# Patient Record
Sex: MALE | Race: BLACK OR AFRICAN AMERICAN | NOT HISPANIC OR LATINO | Employment: FULL TIME | URBAN - METROPOLITAN AREA
[De-identification: names, ages, dates, MRNs, and addresses within clinical notes are randomized per-mention and may not be internally consistent; named-entity substitution may affect disease eponyms.]

---

## 2017-06-28 ENCOUNTER — HOSPITAL ENCOUNTER (EMERGENCY)
Facility: HOSPITAL | Age: 42
Discharge: HOME | End: 2017-06-28
Attending: EMERGENCY MEDICINE
Payer: COMMERCIAL

## 2017-06-28 VITALS
SYSTOLIC BLOOD PRESSURE: 167 MMHG | TEMPERATURE: 98.2 F | HEART RATE: 75 BPM | DIASTOLIC BLOOD PRESSURE: 91 MMHG | RESPIRATION RATE: 18 BRPM | OXYGEN SATURATION: 99 % | WEIGHT: 206 LBS | BODY MASS INDEX: 28.84 KG/M2 | HEIGHT: 71 IN

## 2017-06-28 DIAGNOSIS — K08.89 PAIN, DENTAL: Primary | ICD-10-CM

## 2017-06-28 PROCEDURE — 99282 EMERGENCY DEPT VISIT SF MDM: CPT

## 2017-06-28 RX ORDER — IBUPROFEN 600 MG/1
600 TABLET ORAL ONCE
Status: COMPLETED | OUTPATIENT
Start: 2017-06-28 | End: 2017-06-28

## 2017-06-28 RX ADMIN — IBUPROFEN 600 MG: 600 TABLET, FILM COATED ORAL at 18:01

## 2017-08-12 ENCOUNTER — TRANSCRIBE ORDERS (OUTPATIENT)
Dept: LAB | Facility: HOSPITAL | Age: 42
End: 2017-08-12

## 2017-08-12 ENCOUNTER — APPOINTMENT (OUTPATIENT)
Dept: LAB | Facility: HOSPITAL | Age: 42
End: 2017-08-12
Payer: COMMERCIAL

## 2017-08-12 DIAGNOSIS — Z00.8 HEALTH EXAMINATION IN POPULATION SURVEY: ICD-10-CM

## 2017-08-12 DIAGNOSIS — Z00.8 HEALTH EXAMINATION IN POPULATION SURVEY: Primary | ICD-10-CM

## 2017-08-12 LAB
CHOLEST SERPL-MCNC: 137 MG/DL (ref 50–200)
EST. AVERAGE GLUCOSE BLD GHB EST-MCNC: 131 MG/DL
HBA1C MFR BLD: 6.2 % (ref 4.2–6.3)
HDLC SERPL-MCNC: 56 MG/DL (ref 40–60)
LDLC SERPL CALC-MCNC: 28 MG/DL (ref 0–100)
TRIGL SERPL-MCNC: 265 MG/DL

## 2017-08-12 PROCEDURE — 36415 COLL VENOUS BLD VENIPUNCTURE: CPT

## 2017-08-12 PROCEDURE — 83036 HEMOGLOBIN GLYCOSYLATED A1C: CPT

## 2017-08-12 PROCEDURE — 80061 LIPID PANEL: CPT

## 2018-01-12 NOTE — MISCELLANEOUS
Message  11/18/2016 - 11:08 AM - spoke with patient via phone, throat culture results reviewed (1+ Growth of Beta Hemolytic Streptococcus NOT Group A, C, or G); patient states he is feeling better (currently taking the Z-Bereket)      Signatures   Electronically signed by : Ayla Callahan DO; Nov 18 2016 11:13AM EST                       (Author)

## 2018-01-18 NOTE — MISCELLANEOUS
Message  Return to work or school:    He is able to return to work on  11/16/2016            Signatures   Electronically signed by : Damon Cm DO; Nov 14 2016 10:36AM EST                       (Author)

## 2018-01-18 NOTE — MISCELLANEOUS
Message  11/17/2016 - 16:38 - left message for patient regarding throat culture results (1+ Beta Hemolytic Streptococcus NOT Group A, C, or G)      Signatures   Electronically signed by : Janeth Martínez DO; Nov 17 2016  4:40PM EST                       (Author)

## 2021-11-15 ENCOUNTER — OFFICE VISIT (OUTPATIENT)
Dept: URGENT CARE | Age: 46
End: 2021-11-15
Payer: COMMERCIAL

## 2021-11-15 VITALS
OXYGEN SATURATION: 98 % | HEART RATE: 82 BPM | BODY MASS INDEX: 27.72 KG/M2 | HEIGHT: 71 IN | RESPIRATION RATE: 20 BRPM | DIASTOLIC BLOOD PRESSURE: 90 MMHG | TEMPERATURE: 98.3 F | WEIGHT: 198 LBS | SYSTOLIC BLOOD PRESSURE: 154 MMHG

## 2021-11-15 DIAGNOSIS — G43.901 MIGRAINE WITH STATUS MIGRAINOSUS, NOT INTRACTABLE, UNSPECIFIED MIGRAINE TYPE: Primary | ICD-10-CM

## 2021-11-15 PROCEDURE — S9083 URGENT CARE CENTER GLOBAL: HCPCS | Performed by: NURSE PRACTITIONER

## 2021-11-15 PROCEDURE — G0382 LEV 3 HOSP TYPE B ED VISIT: HCPCS | Performed by: NURSE PRACTITIONER

## 2021-11-15 RX ORDER — PREDNISONE 20 MG/1
20 TABLET ORAL 2 TIMES DAILY WITH MEALS
Qty: 10 TABLET | Refills: 0 | Status: SHIPPED | OUTPATIENT
Start: 2021-11-15 | End: 2021-11-20

## 2021-11-15 RX ORDER — ACETAMINOPHEN 500 MG
TABLET ORAL
COMMUNITY

## 2021-11-30 ENCOUNTER — RA CDI HCC (OUTPATIENT)
Dept: OTHER | Facility: HOSPITAL | Age: 46
End: 2021-11-30

## 2021-12-07 ENCOUNTER — OFFICE VISIT (OUTPATIENT)
Dept: FAMILY MEDICINE CLINIC | Facility: CLINIC | Age: 46
End: 2021-12-07

## 2021-12-07 VITALS
TEMPERATURE: 99.4 F | DIASTOLIC BLOOD PRESSURE: 90 MMHG | HEIGHT: 71 IN | OXYGEN SATURATION: 97 % | WEIGHT: 194 LBS | HEART RATE: 96 BPM | SYSTOLIC BLOOD PRESSURE: 140 MMHG | RESPIRATION RATE: 18 BRPM | BODY MASS INDEX: 27.16 KG/M2

## 2021-12-07 DIAGNOSIS — R33.9 URINARY RETENTION: ICD-10-CM

## 2021-12-07 DIAGNOSIS — G43.809 OTHER MIGRAINE WITHOUT STATUS MIGRAINOSUS, NOT INTRACTABLE: Primary | ICD-10-CM

## 2021-12-07 DIAGNOSIS — R21 RASH: ICD-10-CM

## 2021-12-07 PROBLEM — G43.909 MIGRAINE: Status: ACTIVE | Noted: 2021-12-07

## 2021-12-07 PROCEDURE — 3008F BODY MASS INDEX DOCD: CPT | Performed by: FAMILY MEDICINE

## 2021-12-07 PROCEDURE — 1036F TOBACCO NON-USER: CPT | Performed by: FAMILY MEDICINE

## 2021-12-07 PROCEDURE — 99213 OFFICE O/P EST LOW 20 MIN: CPT | Performed by: FAMILY MEDICINE

## 2021-12-07 RX ORDER — CLOTRIMAZOLE 1 %
CREAM (GRAM) TOPICAL 2 TIMES DAILY
Qty: 30 G | Refills: 0 | Status: SHIPPED | OUTPATIENT
Start: 2021-12-07

## 2021-12-07 RX ORDER — NAPROXEN 500 MG/1
500 TABLET ORAL 2 TIMES DAILY PRN
Qty: 30 TABLET | Refills: 1 | Status: SHIPPED | OUTPATIENT
Start: 2021-12-07

## 2021-12-07 RX ORDER — RIZATRIPTAN BENZOATE 5 MG/1
5 TABLET, ORALLY DISINTEGRATING ORAL ONCE AS NEEDED
Qty: 9 TABLET | Refills: 0 | Status: SHIPPED | OUTPATIENT
Start: 2021-12-07 | End: 2022-01-04 | Stop reason: SDUPTHER

## 2021-12-07 RX ORDER — TAMSULOSIN HYDROCHLORIDE 0.4 MG/1
0.4 CAPSULE ORAL
Qty: 30 CAPSULE | Refills: 0 | Status: SHIPPED | OUTPATIENT
Start: 2021-12-07 | End: 2022-01-04 | Stop reason: SDUPTHER

## 2021-12-09 ENCOUNTER — HOSPITAL ENCOUNTER (OUTPATIENT)
Dept: RADIOLOGY | Facility: HOSPITAL | Age: 46
Discharge: HOME | End: 2021-12-09
Payer: COMMERCIAL

## 2021-12-09 DIAGNOSIS — R33.9 URINARY RETENTION: ICD-10-CM

## 2021-12-09 PROCEDURE — 76857 US EXAM PELVIC LIMITED: CPT

## 2021-12-30 DIAGNOSIS — R33.9 URINARY RETENTION: ICD-10-CM

## 2022-01-04 DIAGNOSIS — R33.9 URINARY RETENTION: ICD-10-CM

## 2022-01-04 DIAGNOSIS — G43.809 OTHER MIGRAINE WITHOUT STATUS MIGRAINOSUS, NOT INTRACTABLE: ICD-10-CM

## 2022-01-05 ENCOUNTER — TELEPHONE (OUTPATIENT)
Dept: FAMILY MEDICINE CLINIC | Facility: CLINIC | Age: 47
End: 2022-01-05

## 2022-01-05 NOTE — TELEPHONE ENCOUNTER
Pharmacy requesting refill of Rizatriptan 5mg for migraines  Patient last seen 12/7/21, was asked to return in 4 weeks for headache follow up  Please call to schedule

## 2022-01-07 RX ORDER — TAMSULOSIN HYDROCHLORIDE 0.4 MG/1
0.4 CAPSULE ORAL
Qty: 30 CAPSULE | Refills: 2 | OUTPATIENT
Start: 2022-01-07

## 2022-01-07 RX ORDER — RIZATRIPTAN BENZOATE 5 MG/1
5 TABLET, ORALLY DISINTEGRATING ORAL ONCE AS NEEDED
Qty: 9 TABLET | Refills: 0 | Status: SHIPPED | OUTPATIENT
Start: 2022-01-07 | End: 2022-06-07

## 2022-01-07 RX ORDER — TAMSULOSIN HYDROCHLORIDE 0.4 MG/1
0.4 CAPSULE ORAL
Qty: 30 CAPSULE | Refills: 2 | Status: SHIPPED | OUTPATIENT
Start: 2022-01-07 | End: 2022-04-18

## 2022-04-18 DIAGNOSIS — R33.9 URINARY RETENTION: ICD-10-CM

## 2022-04-18 RX ORDER — TAMSULOSIN HYDROCHLORIDE 0.4 MG/1
CAPSULE ORAL
Qty: 30 CAPSULE | Refills: 2 | Status: SHIPPED | OUTPATIENT
Start: 2022-04-18

## 2022-05-20 DIAGNOSIS — G43.809 OTHER MIGRAINE WITHOUT STATUS MIGRAINOSUS, NOT INTRACTABLE: ICD-10-CM

## 2022-06-07 RX ORDER — RIZATRIPTAN BENZOATE 5 MG/1
TABLET, ORALLY DISINTEGRATING ORAL
Qty: 9 TABLET | Refills: 0 | Status: SHIPPED | OUTPATIENT
Start: 2022-06-07

## 2022-11-09 ENCOUNTER — OFFICE VISIT (OUTPATIENT)
Dept: FAMILY MEDICINE CLINIC | Facility: CLINIC | Age: 47
End: 2022-11-09

## 2022-11-09 VITALS
HEIGHT: 70 IN | DIASTOLIC BLOOD PRESSURE: 83 MMHG | HEART RATE: 85 BPM | RESPIRATION RATE: 20 BRPM | OXYGEN SATURATION: 100 % | SYSTOLIC BLOOD PRESSURE: 121 MMHG | BODY MASS INDEX: 24.77 KG/M2 | TEMPERATURE: 98.5 F | WEIGHT: 173 LBS

## 2022-11-09 DIAGNOSIS — R68.89 FLUCTUATION OF WEIGHT: ICD-10-CM

## 2022-11-09 DIAGNOSIS — F41.0 PANIC ATTACKS: Primary | ICD-10-CM

## 2022-11-09 DIAGNOSIS — R33.9 URINARY RETENTION: ICD-10-CM

## 2022-11-09 DIAGNOSIS — T78.40XA ALLERGY, INITIAL ENCOUNTER: ICD-10-CM

## 2022-11-09 RX ORDER — TAMSULOSIN HYDROCHLORIDE 0.4 MG/1
0.4 CAPSULE ORAL
Qty: 30 CAPSULE | Refills: 2 | Status: SHIPPED | OUTPATIENT
Start: 2022-11-09

## 2022-11-09 NOTE — LETTER
November 9, 2022     Patient: Douglas Ascencio  YOB: 1975  Date of Visit: 11/9/2022      To Whom it May Concern:    Douglas Ascencio is under my professional care  Jose Luis Gilbert was seen in my office on 11/9/2022  If you have any questions or concerns, please don't hesitate to call           Sincerely,          Cintia Chung DO        CC: No Recipients

## 2022-11-09 NOTE — PROGRESS NOTES
Name: Letty Treadwell      : 1975      MRN: [id number]  Encounter Provider: Belem Viramontes DO  Encounter Date: 2022   Encounter department: [facility]     1  Panic attacks  -     Ambulatory Referral to [facility]; Future  Chronic and controlled at this time  Will refer patient to behavioral health for thorough discussion and education regarding coping strategies as this worked well in the past   Will also have him see in house behavioral health in the interim until he can establish with a therapist in the long-term  2  Fluctuation of weight  Patient does not suffer from unintentional weight loss at this time but instead periodic fluctuations in his weight  He has no red flags on history or exam at this time  Likely etiology is related to his anxiety and panic attacks and fixation regarding his weight  Will advise the patient to keep a daily food diary as well as a log of his weights at home for discussion at next visit  3  Urinary retention  -     tamsulosin (FLOMAX) 0 4 mg; Take 1 capsule (0 4 mg total) by mouth daily with dinner  Patient refused digital rectal exam at this time  He believes he did have some improvement in his symptoms when on Flomax, so at this time we will resume this medication  Patient to consider receiving rectal exam at his next visit with his PCP in 3-4 weeks  4  Allergy, initial encounter  -     Ambulatory Referral to Allergy; Future  For his chronic food allergies, we will refer him to Allergy for formal evaluation and discussion regarding management options  Patient is understanding and accepting of above plan  Subjective      Letty Treadwell is a 41 y o  male presents with multiple complaints  His chief complaint is panic attack  Patient has a chronic history of intermittent panic attacks going on for at least the last 6 years    He states he has been to therapy before and was provided with formal coping strategies which helped until the last couple of years  He is unclear of precipitating causes for his panic attacks  He states he was previously managed with a medication but cannot recall what it was as it was so long ago  He is interested in treatment at this time  Denies suicidal or homicidal ideation at this time  Patient also reports fluctuation in his weight which is also chronic  He says that often his appetite is chronically poor and affected by how much marijuana he smokes  He states he frequently fixates on his weight and weighs himself often  He states when he feels is weight is lower than it should be, he adjusts his diet and increases his protein intake and notes that his weight improves quickly thereafter  Patient continues to report episodes of urinary retention  He states he often has the feeling of incomplete bladder emptying along with urinary frequency  He had some improvement in his symptoms with over-the-counter cranberry supplement  He states since his last visit, he has been out of his previously prescribed tamsulosin  He states he has not had a rectal exam in the past     Patient also continues to report issues with food allergy  He says he has chronically had issues eating uncooked bananas, wherein he notes itching and numbness around his mouth when ingested  He states he now has similar response to kiwi and other fruit in the last few months  He states cook fruit are not bothersome at this time  Patient states he has not had a migraine in sometime  Also states he recently had a colonoscopy at an outside institution on Monday 11/07/2022  Review of Systems   Constitutional: Positive for unexpected weight change  Negative for chills, fatigue and fever  HENT: Negative for congestion, postnasal drip, rhinorrhea, sinus pressure, sinus pain and sore throat  Respiratory: Negative for cough, shortness of breath and wheezing      Cardiovascular: Negative for chest pain and palpitations  Gastrointestinal: Negative for abdominal pain, constipation, diarrhea, nausea and vomiting  Genitourinary: Positive for frequency  Negative for difficulty urinating, dysuria, hematuria and urgency  Urinary retention   Musculoskeletal: Negative for arthralgias, back pain, gait problem and myalgias  Skin: Negative for pallor, rash and wound  Neurological: Negative for dizziness, weakness, light-headedness, numbness and headaches  Psychiatric/Behavioral: Negative for dysphoric mood and suicidal ideas  The patient is nervous/anxious  Current Outpatient Medications on File Prior to Visit   Medication Sig   • clotrimazole (LOTRIMIN) 1 % cream Apply topically 2 (two) times a day   • acetaminophen (TYLENOL) 500 mg tablet Take by mouth (Patient not taking: Reported on 11/9/2022)   • naproxen (Naprosyn) 500 mg tablet Take 1 tablet (500 mg total) by mouth 2 (two) times a day as needed for mild pain or headaches (Patient not taking: Reported on 11/9/2022)   • rizatriptan (MAXALT-MLT) 5 MG disintegrating tablet TAKE 1 TABLET ONCE AS NEEDED FOR MIGRAINE FOR UP TO 1 DOSE MAY REPEAT IN 2 HOURS IF NEEDED (Patient not taking: Reported on 11/9/2022)   • [DISCONTINUED] oxyCODONE-acetaminophen (PERCOCET) 5-325 mg per tablet Take 1 tablet by mouth every 4 (four) hours as needed for moderate pain  Max Daily Amount: 6 tablets (Patient not taking: No sig reported)   • [DISCONTINUED] tamsulosin (FLOMAX) 0 4 mg TAKE 1 CAPSULE BY MOUTH EVERY DAY WITH DINNER (Patient not taking: Reported on 11/9/2022)       Objective     /83 (BP Location: Left arm, Patient Position: Sitting, Cuff Size: Standard)   Pulse 85   Temp 98 5 °F (36 9 °C) (Temporal)   Resp 20   Ht 5' 10" (1 778 m)   Wt 78 5 kg (173 lb)   SpO2 100%   BMI 24 82 kg/m²     Physical Exam  Vitals and nursing note reviewed  Constitutional:       General: He is not in acute distress  Appearance: Normal appearance   He is normal weight  He is not ill-appearing, toxic-appearing or diaphoretic  HENT:      Head: Normocephalic and atraumatic  Eyes:      General: No scleral icterus  Extraocular Movements: Extraocular movements intact  Conjunctiva/sclera: Conjunctivae normal       Pupils: Pupils are equal, round, and reactive to light  Cardiovascular:      Rate and Rhythm: Normal rate and regular rhythm  Pulses: Normal pulses  Heart sounds: Normal heart sounds  No murmur heard  No friction rub  No gallop  Pulmonary:      Effort: Pulmonary effort is normal  No respiratory distress  Breath sounds: Normal breath sounds  No stridor  No wheezing or rhonchi  Abdominal:      General: Bowel sounds are normal  There is no distension  Palpations: Abdomen is soft  There is no mass  Tenderness: There is no abdominal tenderness  There is no guarding or rebound  Hernia: No hernia is present  Genitourinary:     Comments: Rectal exam refused  Musculoskeletal:         General: No swelling, tenderness or deformity  Cervical back: Neck supple  Lymphadenopathy:      Cervical: No cervical adenopathy  Skin:     General: Skin is warm and dry  Capillary Refill: Capillary refill takes less than 2 seconds  Coloration: Skin is not pale  Findings: No erythema or rash  Neurological:      General: No focal deficit present  Mental Status: He is alert  Psychiatric:         Mood and Affect: Mood normal          Behavior: Behavior normal          Thought Content:  Thought content normal          Judgment: Judgment normal        Roberta Carcamo DO

## 2022-11-09 NOTE — PATIENT INSTRUCTIONS
For panic attacks, we will refer you to behavioral health as well as our own behavior health specialist for management in the interim until you can establish with a long-term therapist     For urinary retention, we will restart you on tamsulosin and you will consider getting a rectal exam your next visit in 3-4 weeks  For your weight, please keep a daily food diary as well as log of weights when you do measure them  For your fruit allergies, we will refer you to an allergist for formal evaluation

## 2022-12-02 ENCOUNTER — RA CDI HCC (OUTPATIENT)
Dept: OTHER | Facility: HOSPITAL | Age: 47
End: 2022-12-02

## 2022-12-08 ENCOUNTER — TELEPHONE (OUTPATIENT)
Dept: FAMILY MEDICINE CLINIC | Facility: CLINIC | Age: 47
End: 2022-12-08

## 2022-12-08 DIAGNOSIS — Z13.228 SCREENING FOR METABOLIC DISORDER: ICD-10-CM

## 2022-12-08 DIAGNOSIS — Z11.59 NEED FOR HEPATITIS C SCREENING TEST: Primary | ICD-10-CM

## 2022-12-08 DIAGNOSIS — Z11.4 ENCOUNTER FOR SCREENING FOR HIV: ICD-10-CM

## 2022-12-09 ENCOUNTER — APPOINTMENT (OUTPATIENT)
Dept: LAB | Age: 47
End: 2022-12-09

## 2022-12-09 ENCOUNTER — OFFICE VISIT (OUTPATIENT)
Dept: FAMILY MEDICINE CLINIC | Facility: CLINIC | Age: 47
End: 2022-12-09

## 2022-12-09 VITALS
TEMPERATURE: 97.2 F | DIASTOLIC BLOOD PRESSURE: 100 MMHG | HEART RATE: 87 BPM | BODY MASS INDEX: 24.2 KG/M2 | HEIGHT: 70 IN | SYSTOLIC BLOOD PRESSURE: 148 MMHG | OXYGEN SATURATION: 100 % | WEIGHT: 169 LBS | RESPIRATION RATE: 18 BRPM

## 2022-12-09 DIAGNOSIS — R33.9 URINARY RETENTION: ICD-10-CM

## 2022-12-09 DIAGNOSIS — F41.0 PANIC ATTACKS: Primary | ICD-10-CM

## 2022-12-09 DIAGNOSIS — Z13.228 SCREENING FOR METABOLIC DISORDER: ICD-10-CM

## 2022-12-09 DIAGNOSIS — Z11.4 ENCOUNTER FOR SCREENING FOR HIV: ICD-10-CM

## 2022-12-09 DIAGNOSIS — Z11.59 NEED FOR HEPATITIS C SCREENING TEST: ICD-10-CM

## 2022-12-09 LAB
ALBUMIN SERPL BCP-MCNC: 4.5 G/DL (ref 3.5–5)
ALP SERPL-CCNC: 59 U/L (ref 46–116)
ALT SERPL W P-5'-P-CCNC: 32 U/L (ref 12–78)
ANION GAP SERPL CALCULATED.3IONS-SCNC: 2 MMOL/L (ref 4–13)
AST SERPL W P-5'-P-CCNC: 28 U/L (ref 5–45)
BILIRUB SERPL-MCNC: 0.95 MG/DL (ref 0.2–1)
BUN SERPL-MCNC: 13 MG/DL (ref 5–25)
CALCIUM SERPL-MCNC: 9.5 MG/DL (ref 8.3–10.1)
CHLORIDE SERPL-SCNC: 106 MMOL/L (ref 96–108)
CHOLEST SERPL-MCNC: 133 MG/DL
CO2 SERPL-SCNC: 30 MMOL/L (ref 21–32)
CREAT SERPL-MCNC: 0.84 MG/DL (ref 0.6–1.3)
ERYTHROCYTE [DISTWIDTH] IN BLOOD BY AUTOMATED COUNT: 12.5 % (ref 11.6–15.1)
GFR SERPL CREATININE-BSD FRML MDRD: 105 ML/MIN/1.73SQ M
GLUCOSE P FAST SERPL-MCNC: 104 MG/DL (ref 65–99)
HCT VFR BLD AUTO: 46.3 % (ref 36.5–49.3)
HCV AB SER QL: NORMAL
HDLC SERPL-MCNC: 90 MG/DL
HGB BLD-MCNC: 15.6 G/DL (ref 12–17)
LDLC SERPL CALC-MCNC: 29 MG/DL (ref 0–100)
MCH RBC QN AUTO: 31.8 PG (ref 26.8–34.3)
MCHC RBC AUTO-ENTMCNC: 33.7 G/DL (ref 31.4–37.4)
MCV RBC AUTO: 94 FL (ref 82–98)
NONHDLC SERPL-MCNC: 43 MG/DL
PLATELET # BLD AUTO: 328 THOUSANDS/UL (ref 149–390)
PMV BLD AUTO: 10.6 FL (ref 8.9–12.7)
POTASSIUM SERPL-SCNC: 4 MMOL/L (ref 3.5–5.3)
PROT SERPL-MCNC: 8.2 G/DL (ref 6.4–8.4)
RBC # BLD AUTO: 4.91 MILLION/UL (ref 3.88–5.62)
SODIUM SERPL-SCNC: 138 MMOL/L (ref 135–147)
T4 FREE SERPL-MCNC: 1 NG/DL (ref 0.76–1.46)
TRIGL SERPL-MCNC: 69 MG/DL
TSH SERPL DL<=0.05 MIU/L-ACNC: 0.37 UIU/ML (ref 0.45–4.5)
WBC # BLD AUTO: 5.51 THOUSAND/UL (ref 4.31–10.16)

## 2022-12-09 RX ORDER — BUPROPION HYDROCHLORIDE 75 MG/1
75 TABLET ORAL DAILY
Qty: 30 TABLET | Refills: 0 | Status: SHIPPED | OUTPATIENT
Start: 2022-12-09

## 2022-12-09 RX ORDER — HYDROXYZINE HYDROCHLORIDE 25 MG/1
25 TABLET, FILM COATED ORAL
Qty: 90 TABLET | Refills: 1 | Status: SHIPPED | OUTPATIENT
Start: 2022-12-09

## 2022-12-09 NOTE — ASSESSMENT & PLAN NOTE
-KIRA score is 8 today, indicating mild anxiety   -pt reports he has anxiety attacks where has chest pain, SOB, intense anxiety   Reports he has dealt with these for several years, but they have been getting worse recently  -labwork done yesterday: CBC, CMP, Lipid panel: WNL, TSH 0 37, T4 WNL @ 1 00   -pt has been unable to get in with psychiatry, behavioral health  -has been on paxil in the past for anxiety, but discontinued because of loss of libido     Plan:  · Start welbutrin 37 5mg QD today  · Start atarax 25mg qhs prn anxiety  · Referrals to psychiatry and behavioral health placed today  · Return for follow up in 2 weeks; will increase Welbutrin to 75mg QD at that time if tolerating well

## 2022-12-09 NOTE — LETTER
December 9, 2022     Patient: Aria Pride  YOB: 1975  Date of Visit: 12/9/2022      To Whom it May Concern:    Aria Pride is under my professional care  Yasmin Montejo was seen in my office on 12/9/2022  Yasmin Montejo may return to work on 12/10/22       If you have any questions or concerns, please don't hesitate to call           Sincerely,          Marco Briones DO        CC: No Recipients

## 2022-12-09 NOTE — ASSESSMENT & PLAN NOTE
-chronic for several years, pt with sx of increased urinary frequency, incomplete bladder emptying  -has bladder US in 2021    -taking tamsulosin 0 4mg QD with some relief   -ddx: BPH vs UTI vs obstruction     Plan:  · Check UA, urine cx, PSA  · Referral to urology given

## 2022-12-09 NOTE — PROGRESS NOTES
Name: Debbie Prado      : 1975      MRN: [id number]  Encounter Provider: Jas Young DO  Encounter Date: 2022   Encounter department: [address] [place] [address]  Panic attacks  Assessment & Plan:  -KIRA score is 8 today, indicating mild anxiety   -pt reports he has anxiety attacks where has chest pain, SOB, intense anxiety  Reports he has dealt with these for several years, but they have been getting worse recently  -labwork done yesterday: CBC, CMP, Lipid panel: WNL, TSH 0 37, T4 WNL @ 1 00   -pt has been unable to get in with psychiatry, behavioral health  -has been on paxil in the past for anxiety, but discontinued because of loss of libido     Plan:  · Start welbutrin 37 5mg QD today  · Start atarax 25mg qhs prn anxiety  · Referrals to psychiatry and behavioral health placed today  · Return for follow up in 2 weeks; will increase Welbutrin to 75mg QD at that time if tolerating well    Orders:  -     Ambulatory Referral to Psychiatry; Future  -     Ambulatory Referral to [facility]; Future  -     buPROPion (WELLBUTRIN) 75 mg tablet; Take 1 tablet (75 mg total) by mouth in the morning  -     hydrOXYzine HCL (ATARAX) 25 mg tablet; Take 1 tablet (25 mg total) by mouth daily at bedtime    2  Urinary retention  Assessment & Plan:  -chronic for several years, pt with sx of increased urinary frequency, incomplete bladder emptying  -has bladder US in     -taking tamsulosin 0 4mg QD with some relief   -ddx: BPH vs UTI vs obstruction     Plan:  · Check UA, urine cx, PSA  · Referral to urology given       Orders:  -     PSA Total (Reflex To Free); Future  -     Ambulatory Referral to Urology; Future  -     Urinalysis with microscopic  -     Urine culture; Future         Subjective      Pt presents to discuss anxiety, difficulty urinating  Pt explains he has had anxiety for several years, but recently it has been getting worse   He has been having panic attacks where his chest feels tight and he feels like he can't breathe for several minutes before it resolves on his own  This has happed 3 times in the past week, and it is interfering with his work  He was on paxil a while ago which he reports helped with his anxiety, but decreased his libido so he stopped taking it  He has not been taking any medication or seeing therapy/psychiatry  Also complains of urinary difficulty  Has urinary frequency throughout the day, and feels like he does not completely empty his bladder when he does urinate  Has been taking flomax, which he was prescribed at his last visit on 11/9, he states it is helping with his symptoms  Denies any dysuria or hematuria  Smokes medical marijuana, and reports it helps with his appetite  Review of Systems   Constitutional: Negative for chills and fever  HENT: Negative for ear pain and sore throat  Eyes: Negative for pain and visual disturbance  Respiratory: Negative for cough and shortness of breath  Cardiovascular: Negative for chest pain and palpitations  Gastrointestinal: Negative for abdominal pain, constipation and vomiting  Genitourinary: Positive for decreased urine volume and difficulty urinating  Negative for dysuria, hematuria, penile discharge, penile pain and penile swelling  Musculoskeletal: Negative for arthralgias and back pain  Skin: Negative for color change and rash  Neurological: Negative for seizures and syncope  Psychiatric/Behavioral: The patient is nervous/anxious  All other systems reviewed and are negative        Current Outpatient Medications on File Prior to Visit   Medication Sig   • clotrimazole (LOTRIMIN) 1 % cream Apply topically 2 (two) times a day   • tamsulosin (FLOMAX) 0 4 mg Take 1 capsule (0 4 mg total) by mouth daily with dinner   • acetaminophen (TYLENOL) 500 mg tablet Take by mouth (Patient not taking: Reported on 11/9/2022)   • naproxen (Naprosyn) 500 mg tablet Take 1 tablet (500 mg total) by mouth 2 (two) times a day as needed for mild pain or headaches (Patient not taking: Reported on 11/9/2022)   • rizatriptan (MAXALT-MLT) 5 MG disintegrating tablet TAKE 1 TABLET ONCE AS NEEDED FOR MIGRAINE FOR UP TO 1 DOSE MAY REPEAT IN 2 HOURS IF NEEDED (Patient not taking: Reported on 11/9/2022)       Objective     /100 (BP Location: Left arm, Patient Position: Sitting, Cuff Size: Standard)   Pulse 87   Temp (!) 97 2 °F (36 2 °C) (Temporal)   Resp 18   Ht 5' 10" (1 778 m)   Wt 76 7 kg (169 lb)   SpO2 100%   BMI 24 25 kg/m²     Physical Exam  Constitutional:       General: He is not in acute distress  Appearance: He is not ill-appearing or toxic-appearing  HENT:      Head: Normocephalic and atraumatic  Right Ear: External ear normal       Left Ear: External ear normal       Nose: Nose normal    Eyes:      General: No scleral icterus  Right eye: No discharge  Left eye: No discharge  Conjunctiva/sclera: Conjunctivae normal    Cardiovascular:      Rate and Rhythm: Normal rate and regular rhythm  Pulses: Normal pulses  Heart sounds: Normal heart sounds  No murmur heard  Pulmonary:      Effort: Pulmonary effort is normal       Breath sounds: Normal breath sounds  No wheezing, rhonchi or rales  Musculoskeletal:         General: Normal range of motion  Skin:     General: Skin is warm and dry  Coloration: Skin is not jaundiced  Neurological:      General: No focal deficit present  Mental Status: He is alert and oriented to person, place, and time  Psychiatric:         Attention and Perception: Attention normal          Mood and Affect: Mood is anxious  Behavior: Behavior is agitated  Behavior is cooperative         Ana Blades, DO

## 2022-12-10 LAB
EST. AVERAGE GLUCOSE BLD GHB EST-MCNC: 120 MG/DL
HBA1C MFR BLD: 5.8 %
HIV 1+2 AB+HIV1 P24 AG SERPL QL IA: NORMAL

## 2022-12-12 ENCOUNTER — TELEPHONE (OUTPATIENT)
Dept: UROLOGY | Facility: MEDICAL CENTER | Age: 47
End: 2022-12-12

## 2022-12-12 NOTE — TELEPHONE ENCOUNTER
Please Triage -   New Patient- Parker    What is the reason for the patients appointment? Patient called to schedule appointment for urinary retention  He feels his bladder does not empty completely  Imaging/Lab Results:      Do we accept the patient's insurance or is the patient Self-Pay? Provider & Plan:   Member ID#: Has the patient had any previous urologist(s)?no        Have patient records been requested? Has the patient had any outside testing done?       Does the patient have a personal history of cancer?no      Patient can be reached at :

## 2022-12-14 ENCOUNTER — TELEPHONE (OUTPATIENT)
Dept: FAMILY MEDICINE CLINIC | Facility: CLINIC | Age: 47
End: 2022-12-14

## 2022-12-14 ENCOUNTER — TELEPHONE (OUTPATIENT)
Dept: PSYCHIATRY | Facility: CLINIC | Age: 47
End: 2022-12-14

## 2022-12-14 NOTE — TELEPHONE ENCOUNTER
Folder (To be completed by) -Dr Davidson Kothari     Name of Form - [employer] [facility]  PH# [phone]    Color folder RED    Form to be Faxed (Fax #),[phone]    Called pt and left msg to schedule appt    He was supposed to f/up in 2 wks from 12/9

## 2022-12-14 NOTE — TELEPHONE ENCOUNTER
Good Afternoon Dr Gaston Zhou   After chart review last office visit was on 12/09/2022 with yourself for panic attacks  Patient will need an appointment in order for you to review and sign form? Let us know   Thanks

## 2022-12-14 NOTE — TELEPHONE ENCOUNTER
Called patient regarding referral to be placed on proper wait list  LVM for patient to call intake dept ([phone]) back

## 2022-12-16 ENCOUNTER — OFFICE VISIT (OUTPATIENT)
Dept: FAMILY MEDICINE CLINIC | Facility: CLINIC | Age: 47
End: 2022-12-16

## 2022-12-16 VITALS
DIASTOLIC BLOOD PRESSURE: 90 MMHG | TEMPERATURE: 98.9 F | BODY MASS INDEX: 24.05 KG/M2 | HEIGHT: 70 IN | HEART RATE: 87 BPM | RESPIRATION RATE: 21 BRPM | OXYGEN SATURATION: 99 % | WEIGHT: 168 LBS | SYSTOLIC BLOOD PRESSURE: 142 MMHG

## 2022-12-16 DIAGNOSIS — F41.0 PANIC ATTACKS: Primary | ICD-10-CM

## 2022-12-16 NOTE — ASSESSMENT & PLAN NOTE
-pt reports he has anxiety attacks where has chest pain, SOB, intense anxiety   Reports he has dealt with these for several years, but they had been getting worse recently, improved since he began taking Wellbutrin and atarax prn at last visit on 12/10  -pt has been unable to get in with psychiatry, behavioral health, is on waitlist  -has been on paxil in the past for anxiety, but discontinued because of loss of libido   -needs [facility] paperwork filled out today stating that he is not to be penalized for missing work when he is having a panic attack    Plan:  · Continue Wellbutrin and atarax prn  · Follow up in 1 month   · [facility] paperwork completed, will fax

## 2022-12-16 NOTE — PROGRESS NOTES
Name: Araceli Fox      : 1975      MRN: [id number]  Encounter Provider: Lisandra Melo DO  Encounter Date: 2022   Encounter department: [address] [address]  Panic attacks  Assessment & Plan:  -pt reports he has anxiety attacks where has chest pain, SOB, intense anxiety  Reports he has dealt with these for several years, but they had been getting worse recently, improved since he began taking Wellbutrin and atarax prn at last visit on 12/10  -pt has been unable to get in with psychiatry, behavioral health, is on waitlist  -has been on paxil in the past for anxiety, but discontinued because of loss of libido   -needs [employer]MLA paperwork filled out today stating that he is not to be penalized for missing work when he is having a panic attack    Plan:  · Continue Wellbutrin and atarax prn  · Follow up in 1 month   · FMLA paperwork completed, will fax                Subjective      Pt presents for anxiety follow up and to have form filled out for [facility] for his anxiety attacks  Pt works at [employer] and when he has panic attacks 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  States he has panic attacks atleast weekly, although recently it has been closer to every day  He does notice a difference and improvement in his anxiety since starting Wellbutrin and Hydroxyzine at his last visit on 12/10/22  States he has only had one panic attack this past week, down from daily panic attacks prior to starting medication, and the atarax really helps him with his sleep  Review of Systems   Constitutional: Negative for chills and fever  HENT: Negative for ear pain and sore throat  Eyes: Negative for pain and visual disturbance  Respiratory: Negative for cough and shortness of breath  Cardiovascular: Negative for chest pain and palpitations  Gastrointestinal: Negative for abdominal pain and vomiting  Genitourinary: Negative for dysuria and hematuria  Musculoskeletal: Negative for arthralgias and back pain  Skin: Negative for color change and rash  Neurological: Negative for seizures and syncope  Psychiatric/Behavioral: The patient is nervous/anxious (improving)  All other systems reviewed and are negative  Current Outpatient Medications on File Prior to Visit   Medication Sig   • acetaminophen (TYLENOL) 500 mg tablet Take by mouth   • buPROPion (WELLBUTRIN) 75 mg tablet Take 1 tablet (75 mg total) by mouth in the morning   • clotrimazole (LOTRIMIN) 1 % cream Apply topically 2 (two) times a day   • hydrOXYzine HCL (ATARAX) 25 mg tablet Take 1 tablet (25 mg total) by mouth daily at bedtime   • naproxen (Naprosyn) 500 mg tablet Take 1 tablet (500 mg total) by mouth 2 (two) times a day as needed for mild pain or headaches   • rizatriptan (MAXALT-MLT) 5 MG disintegrating tablet TAKE 1 TABLET ONCE AS NEEDED FOR MIGRAINE FOR UP TO 1 DOSE MAY REPEAT IN 2 HOURS IF NEEDED   • tamsulosin (FLOMAX) 0 4 mg Take 1 capsule (0 4 mg total) by mouth daily with dinner       Objective     /90 (BP Location: Left arm, Patient Position: Sitting, Cuff Size: Large)   Pulse 87   Temp 98 9 °F (37 2 °C) (Temporal)   Resp 21   Ht 5' 10" (1 778 m)   Wt 76 2 kg (168 lb)   SpO2 99%   BMI 24 11 kg/m²     Physical Exam  Constitutional:       General: He is not in acute distress  Appearance: Normal appearance  He is not ill-appearing or toxic-appearing  HENT:      Head: Normocephalic and atraumatic  Right Ear: External ear normal       Left Ear: External ear normal       Nose: Nose normal       Mouth/Throat:      Mouth: Mucous membranes are moist       Pharynx: Oropharynx is clear  Eyes:      General: No scleral icterus  Conjunctiva/sclera: Conjunctivae normal    Cardiovascular:      Rate and Rhythm: Normal rate and regular rhythm  Heart sounds: Normal heart sounds  No murmur heard    Pulmonary: Effort: Pulmonary effort is normal       Breath sounds: Normal breath sounds  No wheezing, rhonchi or rales  Musculoskeletal:         General: Normal range of motion  Cervical back: Neck supple  Skin:     General: Skin is warm and dry  Coloration: Skin is not jaundiced  Neurological:      General: No focal deficit present  Mental Status: He is alert and oriented to person, place, and time  Psychiatric:         Mood and Affect: Mood is anxious (still anxious, but much improved from last visit)  Speech: Speech normal          Behavior: Behavior normal  Behavior is cooperative         Kimberly Friend, DO

## 2022-12-19 NOTE — TELEPHONE ENCOUNTER
Good Morning Clerical  Form reviewed and signed by Dr Ana Machado  Form placed in the Red Folder in the [address] [place] [address] for scan and fax to [phone]   Thanks

## 2023-01-02 DIAGNOSIS — F41.0 PANIC ATTACKS: ICD-10-CM

## 2023-01-03 RX ORDER — BUPROPION HYDROCHLORIDE 75 MG/1
75 TABLET ORAL DAILY
Qty: 90 TABLET | Refills: 1 | Status: SHIPPED | OUTPATIENT
Start: 2023-01-03

## 2023-01-04 ENCOUNTER — OFFICE VISIT (OUTPATIENT)
Dept: UROLOGY | Facility: AMBULATORY SURGERY CENTER | Age: 48
End: 2023-01-04

## 2023-01-04 VITALS
HEART RATE: 84 BPM | DIASTOLIC BLOOD PRESSURE: 88 MMHG | RESPIRATION RATE: 18 BRPM | BODY MASS INDEX: 24.05 KG/M2 | HEIGHT: 70 IN | OXYGEN SATURATION: 96 % | WEIGHT: 168 LBS | SYSTOLIC BLOOD PRESSURE: 150 MMHG

## 2023-01-04 DIAGNOSIS — Z12.5 SCREENING FOR PROSTATE CANCER: ICD-10-CM

## 2023-01-04 DIAGNOSIS — R33.9 URINARY RETENTION: Primary | ICD-10-CM

## 2023-01-04 LAB — POST-VOID RESIDUAL VOLUME, ML POC: 84 ML

## 2023-01-04 RX ORDER — TAMSULOSIN HYDROCHLORIDE 0.4 MG/1
0.8 CAPSULE ORAL
Qty: 180 CAPSULE | Refills: 2 | Status: SHIPPED | OUTPATIENT
Start: 2023-01-04

## 2023-01-04 NOTE — PROGRESS NOTES
01/04/23    Gray Marroquin   1975   [id number]     Assessment  1 Difficulty emptying   2 Prostate cancer screening     Discussion/Plan  1 Difficulty emptying    PVR 84    Continue Tamsulosin at increased dose of 0 8 mg once daily   Hydrate with water, avoid bladder irritants, avoid constipation, double void   Reviewed that cold medications, use of medical marijuana, and prescription hydroxyzine can contribute to hesitancy and urinary retention   2 Prostate cancer screening    PSA ordered      Return in 4 weeks to review efficacy of medication and review PSA  Subjective  HPI   Gray Marroquin is a 41year old male with history of chronic urinary retention and dysuria  He is managed on Tamsulosin 0 4 mg once daily prescribed by his PCP  He noticed changes to his urinary pattern a few years ago after he had perianal abscess and fistula which was treated and repaired  His urinary frequency increased and he noticed difficulties emptying  Imaging from December 2021 showed he emptied his bladder well with pre-void 274 cc and post void 38 cc  He denies diabetes history  He has never required CIC or had an indwelling catheter  He hydrates adequately with water  He is a current 1/2 ppd smoker and uses medical marijuana regularly  He states his dad has history of prostate cancer  He denies gross hematuria, dysuria, pelvic pain, unintentional weight loss or infectious symptoms  Review of Systems - History obtained from chart review and the patient  General ROS: negative  Psychological ROS: negative  Respiratory ROS: no cough, shortness of breath, or wheezing  Cardiovascular ROS: no chest pain or dyspnea on exertion  Gastrointestinal ROS: no abdominal pain, change in bowel habits, or black or bloody stools  Genito-Urinary ROS: positive for - dysuria  Musculoskeletal ROS: negative  Neurological ROS: negative  Dermatological ROS: negative       Objective  Physical Exam  Vitals and nursing note reviewed     Constitutional: General: He is awake  He is not in acute distress  Appearance: Normal appearance  He is well-developed, well-groomed and normal weight  He is not ill-appearing, toxic-appearing or diaphoretic  Pulmonary:      Effort: Pulmonary effort is normal    Abdominal:      Tenderness: There is no right CVA tenderness or left CVA tenderness  Musculoskeletal:         General: Normal range of motion  Cervical back: Normal range of motion and neck supple  Skin:     General: Skin is warm  Neurological:      General: No focal deficit present  Mental Status: He is alert and oriented to person, place, and time  Mental status is at baseline  Psychiatric:         Attention and Perception: Attention normal          Mood and Affect: Mood normal          Speech: Speech normal          Behavior: Behavior normal  Behavior is cooperative  Thought Content: Thought content normal          Cognition and Memory: Cognition normal          Judgment: Judgment normal              WESLEY Maynard     I have spent 15 minutes with Patient  today in which greater than 50% of this time was spent in counseling/coordination of care regarding Risks and benefits of tx options, Intructions for management, Patient and family education, Importance of tx compliance and Risk factor reductions

## 2023-02-15 ENCOUNTER — TELEPHONE (OUTPATIENT)
Dept: FAMILY MEDICINE CLINIC | Facility: CLINIC | Age: 48
End: 2023-02-15

## 2023-02-15 NOTE — TELEPHONE ENCOUNTER
Folder (To be completed by) -  Dr True Girard     Name of Form - Updated FMLA from 12/14/22    Color folder (RED0    Form to be Faxed (Fax #),      Patient was made aware of the 7-10 business day form policy

## 2023-02-16 NOTE — TELEPHONE ENCOUNTER
Form in the Red Folder in the Clinical Area for Dr Shobha Dueñas to review and sign [Hoarseness] : no hoarseness [Itching] : Itching [Skin Rash] : skin rash [Negative] : Psychiatric

## 2023-02-23 NOTE — TELEPHONE ENCOUNTER
Form reviewed and signed by Dr Harjit Ferreira  Form placed in the Red Folder in the [address] [place] [address] for scan and fax to [phone]   Thanks

## 2023-06-05 ENCOUNTER — TELEPHONE (OUTPATIENT)
Dept: FAMILY MEDICINE CLINIC | Facility: CLINIC | Age: 48
End: 2023-06-05

## 2023-06-05 NOTE — TELEPHONE ENCOUNTER
Folder (To be completed by) -Dr Ankush Pompa      Name of Form - [employer] [place]    Color folder (Red)    Form to be Faxed (Fax #), Mailed (Address), or Picked up (By whom) -    Patient was made aware of the 7-10 business day form policy

## 2023-06-16 ENCOUNTER — OFFICE VISIT (OUTPATIENT)
Dept: FAMILY MEDICINE CLINIC | Facility: CLINIC | Age: 48
End: 2023-06-16

## 2023-06-16 VITALS
TEMPERATURE: 98.5 F | DIASTOLIC BLOOD PRESSURE: 88 MMHG | BODY MASS INDEX: 23.68 KG/M2 | SYSTOLIC BLOOD PRESSURE: 126 MMHG | WEIGHT: 165 LBS

## 2023-06-16 DIAGNOSIS — F41.0 PANIC ATTACKS: Primary | ICD-10-CM

## 2023-06-16 PROCEDURE — 99213 OFFICE O/P EST LOW 20 MIN: CPT | Performed by: FAMILY MEDICINE

## 2023-06-16 RX ORDER — BUPROPION HYDROCHLORIDE 100 MG/1
100 TABLET ORAL DAILY
Qty: 30 TABLET | Refills: 0 | Status: SHIPPED | OUTPATIENT
Start: 2023-06-16 | End: 2023-07-16

## 2023-06-16 NOTE — ASSESSMENT & PLAN NOTE
-pt reports he has anxiety attacks where has chest pain, SOB, intense anxiety   Reports he has dealt with these for several years, but they had been getting worse recently, improved since he began taking Wellbutrin in Dec 2022, but began recurring over past few weeks  -pt has been unable to get in with psychiatry, behavioral health, is on waitlist  -has been on paxil in the past for anxiety, but discontinued because of loss of libido and does not want to be on any other SSRIs for that reason  -needs [facility] paperwork filled updated today stating that he is not to be penalized for missing work when he is having a panic attack  -KIRA-7 score is 8 today, indicating mild anxiety    Plan:  · Increase wellbutrin from 75mg to 100mg QD  · Follow up in 2 weeks, can consider increasing Wellbutrin to 150mg QD at that time if needed   · [facility] paperwork completed, will fax

## 2023-06-16 NOTE — PROGRESS NOTES
Name: Paulo Urban      : 1975      MRN: [id number]  Encounter Provider: Néstor Alvarez DO  Encounter Date: 2023   Encounter department: [address] [place] [address]  Panic attacks  Assessment & Plan:  -pt reports he has anxiety attacks where has chest pain, SOB, intense anxiety  Reports he has dealt with these for several years, but they had been getting worse recently, improved since he began taking Wellbutrin in Dec 2022, but began recurring over past few weeks  -pt has been unable to get in with psychiatry, behavioral health, is on waitlist  -has been on paxil in the past for anxiety, but discontinued because of loss of libido and does not want to be on any other SSRIs for that reason  -needs [facility] paperwork filled updated today stating that he is not to be penalized for missing work when he is having a panic attack  -KIRA-7 score is 8 today, indicating mild anxiety    Plan:  · Increase wellbutrin from 75mg to 100mg QD  · Follow up in 2 weeks, can consider increasing Wellbutrin to 150mg QD at that time if needed   · [facility] paperwork completed, will fax         Orders:  -     buPROPion (WELLBUTRIN) 100 mg tablet; Take 1 tablet (100 mg total) by mouth in the morning         Subjective      Pt presents for follow up visit for panic attacks  States they were well controlled on Wellbutrin 75mg QD for several months but have been returning over the past few weeks  Denies any specific triggers  Does not take atarax prn as he finds it makes him too sleepy  Requests his [place] paperwork be updated stating that he may have time off when he is having a panic attack  Review of Systems   Constitutional: Negative for chills and fever  HENT: Negative for ear pain and sore throat  Eyes: Negative for pain and visual disturbance  Respiratory: Negative for cough and shortness of breath  Cardiovascular: Negative for chest pain and palpitations  Gastrointestinal: Negative for abdominal pain, constipation and vomiting  Genitourinary: Negative for dysuria and hematuria  Musculoskeletal: Negative for arthralgias and back pain  Skin: Negative for color change and rash  Neurological: Negative for seizures and syncope  Psychiatric/Behavioral: The patient is nervous/anxious  All other systems reviewed and are negative  Current Outpatient Medications on File Prior to Visit   Medication Sig   • acetaminophen (TYLENOL) 500 mg tablet Take by mouth   • clotrimazole (LOTRIMIN) 1 % cream Apply topically 2 (two) times a day   • naproxen (Naprosyn) 500 mg tablet Take 1 tablet (500 mg total) by mouth 2 (two) times a day as needed for mild pain or headaches   • rizatriptan (MAXALT-MLT) 5 MG disintegrating tablet TAKE 1 TABLET ONCE AS NEEDED FOR MIGRAINE FOR UP TO 1 DOSE MAY REPEAT IN 2 HOURS IF NEEDED   • tamsulosin (FLOMAX) 0 4 mg Take 2 capsules (0 8 mg total) by mouth daily with dinner   • [DISCONTINUED] buPROPion (WELLBUTRIN) 75 mg tablet TAKE 1 TABLET (75 MG TOTAL) BY MOUTH IN THE MORNING   • [DISCONTINUED] hydrOXYzine HCL (ATARAX) 25 mg tablet Take 1 tablet (25 mg total) by mouth daily at bedtime       Objective     /88   Temp 98 5 °F (36 9 °C)   Wt 74 8 kg (165 lb)   BMI 23 68 kg/m²     Physical Exam  Constitutional:       General: He is not in acute distress  Appearance: He is not ill-appearing or toxic-appearing  HENT:      Head: Normocephalic and atraumatic  Right Ear: External ear normal       Left Ear: External ear normal       Nose: Nose normal       Mouth/Throat:      Mouth: Mucous membranes are moist       Pharynx: Oropharynx is clear  Eyes:      General: No scleral icterus  Conjunctiva/sclera: Conjunctivae normal    Cardiovascular:      Rate and Rhythm: Normal rate and regular rhythm  Heart sounds: Normal heart sounds  No murmur heard    Pulmonary:      Effort: Pulmonary effort is normal  No respiratory distress  Breath sounds: Normal breath sounds  No wheezing, rhonchi or rales  Abdominal:      General: Bowel sounds are normal       Palpations: Abdomen is soft  Tenderness: There is no abdominal tenderness  There is no guarding  Hernia: No hernia is present  Musculoskeletal:         General: No swelling  Right lower leg: No edema  Left lower leg: No edema  Skin:     General: Skin is warm and dry  Coloration: Skin is not jaundiced  Neurological:      General: No focal deficit present  Mental Status: He is alert and oriented to person, place, and time  Mental status is at baseline     Psychiatric:         Mood and Affect: Mood normal          Behavior: Behavior normal        Aleja Bench, DO

## 2023-07-21 DIAGNOSIS — F41.0 PANIC ATTACKS: ICD-10-CM

## 2023-07-21 NOTE — TELEPHONE ENCOUNTER
6/16/2023 patient was suppose to follow up in two weeks and was a no show. Please contact patient for an appt. Thank you.

## 2023-07-24 RX ORDER — BUPROPION HYDROCHLORIDE 100 MG/1
100 TABLET ORAL DAILY
Qty: 30 TABLET | Refills: 0 | Status: SHIPPED | OUTPATIENT
Start: 2023-07-24 | End: 2023-07-25

## 2023-07-25 ENCOUNTER — OFFICE VISIT (OUTPATIENT)
Dept: FAMILY MEDICINE CLINIC | Facility: CLINIC | Age: 48
End: 2023-07-25

## 2023-07-25 DIAGNOSIS — F41.0 PANIC ATTACKS: Primary | ICD-10-CM

## 2023-07-25 PROCEDURE — 99213 OFFICE O/P EST LOW 20 MIN: CPT | Performed by: FAMILY MEDICINE

## 2023-07-25 RX ORDER — BUPROPION HYDROCHLORIDE 150 MG/1
150 TABLET ORAL EVERY MORNING
Qty: 90 TABLET | Refills: 1 | Status: SHIPPED | OUTPATIENT
Start: 2023-07-25 | End: 2024-01-21

## 2023-07-25 NOTE — PROGRESS NOTES
Name: Brigid Gutierrze      : 1975      MRN: [id number]  Encounter Provider: Luis Alberto Magallon DO  Encounter Date: 2023   Encounter department: [address]     1. Panic attacks  Assessment & Plan:  -pt reports he has anxiety attacks where has chest pain, SOB, intense anxiety. Reports he has dealt with these for several years, but they had been getting worse recently, improved since he began taking Wellbutrin in Dec 2022 and was well controlled for several months, but began recurring in 2023  -pt has been unable to get in with psychiatry, behavioral health, is on waitlist  -has been on paxil in the past for anxiety, but discontinued because of loss of libido and does not want to be on any other SSRIs for that reason; has been on prn atarax in addition to Wellbutrin for anxiety but discontinued because it made him drowsy throughout the day  -currently taking Wellbutrin 100mg; does not notice much of an improvement since increasing dose from 75mg at last visit on     Plan:  · Increase wellbutrin from 100mg to 150mg QD; discussed taking 75mg BID, but patient prefers to take it once daily given his work schedule  · Reviewed labwork, last TSH in Dec 2022 was low at 0.374 with reflex T4 WNL, will get repeart TSH, CMP   · Follow up in 1 month, will also schedule with Dr. Myrtle Zhou for soonest possible appointment         Orders:  -     TSH, 3rd generation with Free T4 reflex; Future  -     Comprehensive metabolic panel; Future  -     Ambulatory Referral to [facility]; Future  -     buPROPion (WELLBUTRIN XL) 150 mg 24 hr tablet; Take 1 tablet (150 mg total) by mouth every morning         Subjective      Here for follow up for anxiety, panic attacks. He was well controlled on Wellbutrin 75mg for several months, but panic attacks resumed in 2023. Pt denies any new triggers. Was last seen in the office on .  At that time Wellbutrin was increased from 75mg to 100mg. Pt reports he does not notice much of an improvement. States since then he has had 3 panic attacks over the past few weeks. Feels like they just come on out of nowhere. He feels stressful thoughts about work trigger them sometimes, but sometimes there is no trigger at all. Review of Systems   Constitutional: Negative for chills and fever. HENT: Negative for ear pain and sore throat. Eyes: Negative for pain and visual disturbance. Respiratory: Negative for cough and shortness of breath. Cardiovascular: Negative for chest pain and palpitations. Gastrointestinal: Negative for abdominal pain, constipation and vomiting. Genitourinary: Negative for dysuria and hematuria. Musculoskeletal: Negative for arthralgias and back pain. Skin: Negative for color change and rash. Neurological: Negative for seizures and syncope. Psychiatric/Behavioral: The patient is nervous/anxious. All other systems reviewed and are negative. Current Outpatient Medications on File Prior to Visit   Medication Sig   • acetaminophen (TYLENOL) 500 mg tablet Take by mouth   • clotrimazole (LOTRIMIN) 1 % cream Apply topically 2 (two) times a day   • naproxen (Naprosyn) 500 mg tablet Take 1 tablet (500 mg total) by mouth 2 (two) times a day as needed for mild pain or headaches   • rizatriptan (MAXALT-MLT) 5 MG disintegrating tablet TAKE 1 TABLET ONCE AS NEEDED FOR MIGRAINE FOR UP TO 1 DOSE MAY REPEAT IN 2 HOURS IF NEEDED   • tamsulosin (FLOMAX) 0.4 mg Take 2 capsules (0.8 mg total) by mouth daily with dinner   • [DISCONTINUED] buPROPion (WELLBUTRIN) 100 mg tablet TAKE 1 TABLET (100 MG TOTAL) BY MOUTH IN THE MORNING       Objective     There were no vitals taken for this visit. Physical Exam  Constitutional:       General: He is not in acute distress. Appearance: He is not ill-appearing or toxic-appearing. HENT:      Head: Normocephalic and atraumatic.       Right Ear: External ear normal.      Left Ear: External ear normal.      Nose: Nose normal.      Mouth/Throat:      Mouth: Mucous membranes are moist.      Pharynx: Oropharynx is clear. Eyes:      General: No scleral icterus. Conjunctiva/sclera: Conjunctivae normal.   Cardiovascular:      Rate and Rhythm: Normal rate and regular rhythm. Heart sounds: Normal heart sounds. No murmur heard. Pulmonary:      Effort: Pulmonary effort is normal. No respiratory distress. Breath sounds: Normal breath sounds. No wheezing, rhonchi or rales. Abdominal:      General: Bowel sounds are normal.      Palpations: Abdomen is soft. Tenderness: There is no abdominal tenderness. There is no guarding. Hernia: No hernia is present. Musculoskeletal:         General: No swelling. Right lower leg: No edema. Left lower leg: No edema. Skin:     General: Skin is warm and dry. Coloration: Skin is not jaundiced. Neurological:      General: No focal deficit present. Mental Status: He is alert and oriented to person, place, and time. Mental status is at baseline.    Psychiatric:         Mood and Affect: Mood normal.         Behavior: Behavior normal.       Camila Laurent DO

## 2023-07-25 NOTE — ASSESSMENT & PLAN NOTE
-pt reports he has anxiety attacks where has chest pain, SOB, intense anxiety.  Reports he has dealt with these for several years, but they had been getting worse recently, improved since he began taking Wellbutrin in Dec 2022 and was well controlled for several months, but began recurring in June 2023  -pt has been unable to get in with psychiatry, behavioral health, is on waitlist  -has been on paxil in the past for anxiety, but discontinued because of loss of libido and does not want to be on any other SSRIs for that reason; has been on prn atarax in addition to Wellbutrin for anxiety but discontinued because it made him drowsy throughout the day  -currently taking Wellbutrin 100mg; does not notice much of an improvement since increasing dose from 75mg at last visit on 6/16    Plan:  · Increase wellbutrin from 100mg to 150mg QD; discussed taking 75mg BID, but patient prefers to take it once daily given his work schedule  · Reviewed labwork, last TSH in Dec 2022 was low at 0.374 with reflex T4 WNL, will get repeart TSH, CMP   · Follow up in 1 month, will also schedule with Dr. Clarissa Marroquin for soonest possible appointment

## 2023-07-26 ENCOUNTER — TELEPHONE (OUTPATIENT)
Dept: PSYCHIATRY | Facility: CLINIC | Age: 48
End: 2023-07-26

## 2023-07-26 NOTE — TELEPHONE ENCOUNTER
Patient has been added to the [facility] wait list with a referral.    Insurance: [employer] Type:    Commercial [x]   Medicaid []   [employer] (if applicable)   Medicare []  Location Preference: [place]  Provider Preference: None  Virtual: Yes [x] No []

## 2023-08-21 ENCOUNTER — RA CDI HCC (OUTPATIENT)
Dept: OTHER | Facility: HOSPITAL | Age: 48
End: 2023-08-21

## 2023-08-21 NOTE — PROGRESS NOTES
[facility] coding opportunities       Chart reviewed, no opportunity found: CHART REVIEWED, NO OPPORTUNITY FOUND        Patients Insurance        Commercial Insurance: Gaston Tabor

## 2023-08-24 ENCOUNTER — TELEPHONE (OUTPATIENT)
Dept: FAMILY MEDICINE CLINIC | Facility: CLINIC | Age: 48
End: 2023-08-24

## 2023-08-24 NOTE — TELEPHONE ENCOUNTER
Please review and complete dorm    [employer] form    Claim number [id number]    Dr Zheng Query folder    Fax [phone]

## 2023-08-27 ENCOUNTER — LAB (OUTPATIENT)
Dept: LAB | Age: 48
End: 2023-08-27
Payer: COMMERCIAL

## 2023-08-27 DIAGNOSIS — Z12.5 SCREENING FOR PROSTATE CANCER: ICD-10-CM

## 2023-08-27 DIAGNOSIS — F41.0 PANIC ATTACKS: ICD-10-CM

## 2023-08-27 DIAGNOSIS — R33.9 URINARY RETENTION: ICD-10-CM

## 2023-08-27 LAB
ALBUMIN SERPL BCP-MCNC: 4.7 G/DL (ref 3.5–5)
ALP SERPL-CCNC: 54 U/L (ref 34–104)
ALT SERPL W P-5'-P-CCNC: 19 U/L (ref 7–52)
ANION GAP SERPL CALCULATED.3IONS-SCNC: 7 MMOL/L
AST SERPL W P-5'-P-CCNC: 24 U/L (ref 13–39)
BACTERIA UR QL AUTO: NORMAL /HPF
BILIRUB SERPL-MCNC: 0.54 MG/DL (ref 0.2–1)
BILIRUB UR QL STRIP: NEGATIVE
BUN SERPL-MCNC: 17 MG/DL (ref 5–25)
CALCIUM SERPL-MCNC: 9.7 MG/DL (ref 8.4–10.2)
CHLORIDE SERPL-SCNC: 102 MMOL/L (ref 96–108)
CLARITY UR: CLEAR
CO2 SERPL-SCNC: 28 MMOL/L (ref 21–32)
COLOR UR: YELLOW
CREAT SERPL-MCNC: 0.73 MG/DL (ref 0.6–1.3)
GFR SERPL CREATININE-BSD FRML MDRD: 111 ML/MIN/1.73SQ M
GLUCOSE P FAST SERPL-MCNC: 105 MG/DL (ref 65–99)
GLUCOSE UR STRIP-MCNC: NEGATIVE MG/DL
HGB UR QL STRIP.AUTO: NEGATIVE
KETONES UR STRIP-MCNC: NEGATIVE MG/DL
LEUKOCYTE ESTERASE UR QL STRIP: NEGATIVE
NITRITE UR QL STRIP: NEGATIVE
NON-SQ EPI CELLS URNS QL MICRO: NORMAL /HPF
PH UR STRIP.AUTO: 6 [PH]
POTASSIUM SERPL-SCNC: 4 MMOL/L (ref 3.5–5.3)
PROT SERPL-MCNC: 7.8 G/DL (ref 6.4–8.4)
PROT UR STRIP-MCNC: NEGATIVE MG/DL
PSA SERPL-MCNC: 0.15 NG/ML (ref 0–4)
RBC #/AREA URNS AUTO: NORMAL /HPF
SODIUM SERPL-SCNC: 137 MMOL/L (ref 135–147)
SP GR UR STRIP.AUTO: 1.02 (ref 1–1.03)
TSH SERPL DL<=0.05 MIU/L-ACNC: 0.68 UIU/ML (ref 0.45–4.5)
UROBILINOGEN UR STRIP-ACNC: <2 MG/DL
WBC #/AREA URNS AUTO: NORMAL /HPF

## 2023-08-27 PROCEDURE — 36415 COLL VENOUS BLD VENIPUNCTURE: CPT

## 2023-08-27 PROCEDURE — 80053 COMPREHEN METABOLIC PANEL: CPT

## 2023-08-27 PROCEDURE — 84443 ASSAY THYROID STIM HORMONE: CPT

## 2023-08-27 PROCEDURE — G0103 PSA SCREENING: HCPCS

## 2023-08-27 PROCEDURE — 87086 URINE CULTURE/COLONY COUNT: CPT

## 2023-08-28 ENCOUNTER — TELEPHONE (OUTPATIENT)
Dept: UROLOGY | Facility: AMBULATORY SURGERY CENTER | Age: 48
End: 2023-08-28

## 2023-08-28 LAB — BACTERIA UR CULT: NORMAL

## 2023-08-28 NOTE — TELEPHONE ENCOUNTER
Tried calling patient to let him know that his PSA was normal at 0.15. Ludwin Blanco our NP plan is for him to follow up in 1 year with another PSA. When pt call back if you can please let him know left detailed message explaining  to give our office a call back. ----- Message from Jasson Heredia Dr sent at 8/28/2023  7:57 AM EDT -----  Please let patient know his PSA was normal at 0.15.   Can repeat this in 1 year due to family history of prostate cancer in his father

## 2023-08-29 ENCOUNTER — OFFICE VISIT (OUTPATIENT)
Dept: FAMILY MEDICINE CLINIC | Facility: CLINIC | Age: 48
End: 2023-08-29

## 2023-08-29 VITALS
OXYGEN SATURATION: 98 % | TEMPERATURE: 98.2 F | SYSTOLIC BLOOD PRESSURE: 125 MMHG | DIASTOLIC BLOOD PRESSURE: 81 MMHG | HEIGHT: 70 IN | WEIGHT: 165.6 LBS | RESPIRATION RATE: 16 BRPM | HEART RATE: 93 BPM | BODY MASS INDEX: 23.71 KG/M2

## 2023-08-29 DIAGNOSIS — F41.9 ANXIETY: Primary | ICD-10-CM

## 2023-08-29 PROBLEM — O99.340: Status: ACTIVE | Noted: 2023-08-29

## 2023-08-29 PROBLEM — O60.00: Status: ACTIVE | Noted: 2023-08-29

## 2023-08-29 PROBLEM — F41.8: Status: ACTIVE | Noted: 2023-08-29

## 2023-08-29 PROCEDURE — 99213 OFFICE O/P EST LOW 20 MIN: CPT | Performed by: FAMILY MEDICINE

## 2023-08-29 RX ORDER — SERTRALINE HYDROCHLORIDE 25 MG/1
25 TABLET, FILM COATED ORAL DAILY
Qty: 90 TABLET | Refills: 3 | Status: SHIPPED | OUTPATIENT
Start: 2023-08-29 | End: 2024-08-23

## 2023-08-29 NOTE — ASSESSMENT & PLAN NOTE
-pt reports he has anxiety attacks where has chest pain, SOB, intense anxiety. Reports he has dealt with these for several years, but they had been getting worse recently, improved since he began taking Wellbutrin in Dec 2022 and was well controlled for several months, but began recurring in June 2023  -pt has been unable to get in with psychiatry, behavioral health, is on waitlist  -has been on paxil in the past for anxiety, but discontinued because of loss of libido and does not want to be on any other SSRIs for that reason; has been on prn atarax in addition to Wellbutrin for anxiety but discontinued because it made him drowsy throughout the day. Is will to try another SSRI today  -currently taking Wellbutrin 150mg; does not notice much of an improvement since increasing dose from 100 mg at last visit on 7/25  -KIRA-7 score is 6 today, indicating mild anxiety. Down from 8 two months ago.   -Reviewed bloodwork from 8/27/23, TSH and CMP WNL    Plan:  · Continue Wellbutrin 150mg QD, pt is agreeable to try an SSRI today  · Will start sertraline 25mg QD  · Follow up in 1 month, will also schedule with Dr. Prateek Nicole for soonest possible appointment

## 2023-08-29 NOTE — PROGRESS NOTES
Name: Jason Sandoval      : 1975      MRN: [id number]  Encounter Provider: Enedelia Silvestre DO  Encounter Date: 2023   Encounter department: [address]     1. Anxiety  Assessment & Plan:  -pt reports he has anxiety attacks where has chest pain, SOB, intense anxiety. Reports he has dealt with these for several years, but they had been getting worse recently, improved since he began taking Wellbutrin in Dec 2022 and was well controlled for several months, but began recurring in 2023  -pt has been unable to get in with psychiatry, behavioral health, is on waitlist  -has been on paxil in the past for anxiety, but discontinued because of loss of libido and does not want to be on any other SSRIs for that reason; has been on prn atarax in addition to Wellbutrin for anxiety but discontinued because it made him drowsy throughout the day. Is will to try another SSRI today  -currently taking Wellbutrin 150mg; does not notice much of an improvement since increasing dose from 100 mg at last visit on   -KIRA-7 score is 6 today, indicating mild anxiety. Down from 8 two months ago. -Reviewed bloodwork from 23, TSH and CMP WNL    Plan:  · Continue Wellbutrin 150mg QD, pt is agreeable to try an SSRI today  · Will start sertraline 25mg QD  · Follow up in 1 month, will also schedule with Dr. Brie Agustin for soonest possible appointment         Orders:  -     sertraline (ZOLOFT) 25 mg tablet; Take 1 tablet (25 mg total) by mouth daily         Subjective      Pt presents for follow up visit for anxiety. States he is still feeling very anxious on the Wellbutrin 150mg QD. Is unable to get in with psychology because the waitlist is 3 months long. Would like to try another medication in conjunction with the wellbutrin. Review of Systems   Constitutional: Negative for chills and fever. HENT: Negative for ear pain and sore throat.     Eyes: Negative for pain and visual disturbance. Respiratory: Negative for cough and shortness of breath. Cardiovascular: Negative for chest pain and palpitations. Gastrointestinal: Negative for abdominal pain, constipation and vomiting. Genitourinary: Negative for dysuria and hematuria. Musculoskeletal: Negative for arthralgias and back pain. Skin: Negative for color change and rash. Neurological: Negative for seizures and syncope. Psychiatric/Behavioral: The patient is nervous/anxious. All other systems reviewed and are negative. Current Outpatient Medications on File Prior to Visit   Medication Sig   • buPROPion (WELLBUTRIN XL) 150 mg 24 hr tablet Take 1 tablet (150 mg total) by mouth every morning   • clotrimazole (LOTRIMIN) 1 % cream Apply topically 2 (two) times a day   • rizatriptan (MAXALT-MLT) 5 MG disintegrating tablet TAKE 1 TABLET ONCE AS NEEDED FOR MIGRAINE FOR UP TO 1 DOSE MAY REPEAT IN 2 HOURS IF NEEDED   • tamsulosin (FLOMAX) 0.4 mg Take 2 capsules (0.8 mg total) by mouth daily with dinner   • acetaminophen (TYLENOL) 500 mg tablet Take by mouth (Patient not taking: Reported on 8/29/2023)   • naproxen (Naprosyn) 500 mg tablet Take 1 tablet (500 mg total) by mouth 2 (two) times a day as needed for mild pain or headaches (Patient not taking: Reported on 8/29/2023)       Objective     /81 (BP Location: Right arm, Patient Position: Sitting, Cuff Size: Standard)   Pulse 93   Temp 98.2 °F (36.8 °C) (Temporal)   Resp 16   Ht 5' 10" (1.778 m)   Wt 75.1 kg (165 lb 9.6 oz)   SpO2 98%   BMI 23.76 kg/m²     Physical Exam  Constitutional:       General: He is not in acute distress. Appearance: He is not ill-appearing or toxic-appearing. HENT:      Head: Normocephalic and atraumatic. Right Ear: External ear normal.      Left Ear: External ear normal.      Nose: Nose normal.      Mouth/Throat:      Mouth: Mucous membranes are moist.      Pharynx: Oropharynx is clear.    Eyes:      General: No scleral icterus. Conjunctiva/sclera: Conjunctivae normal.   Cardiovascular:      Rate and Rhythm: Normal rate and regular rhythm. Heart sounds: Normal heart sounds. No murmur heard. Pulmonary:      Effort: Pulmonary effort is normal. No respiratory distress. Breath sounds: Normal breath sounds. No wheezing, rhonchi or rales. Abdominal:      General: Bowel sounds are normal.      Palpations: Abdomen is soft. Tenderness: There is no abdominal tenderness. There is no guarding. Hernia: No hernia is present. Musculoskeletal:         General: No swelling. Right lower leg: No edema. Left lower leg: No edema. Skin:     General: Skin is warm and dry. Coloration: Skin is not jaundiced. Neurological:      General: No focal deficit present. Mental Status: He is alert and oriented to person, place, and time. Mental status is at baseline.    Psychiatric:         Mood and Affect: Mood normal.         Behavior: Behavior normal.       Alfredo Grande DO

## 2023-08-30 ENCOUNTER — TELEPHONE (OUTPATIENT)
Dept: UROLOGY | Facility: CLINIC | Age: 48
End: 2023-08-30

## 2023-08-30 NOTE — TELEPHONE ENCOUNTER
Okay to leave message per communication consent     Voicemail left relaying message below. Office  Number left to call back to schedule yearly. ----- Message from Jasson Heredia Dr sent at 8/28/2023  7:57 AM EDT -----  Please let patient know his PSA was normal at 0.15.   Can repeat this in 1 year due to family history of prostate cancer in his father

## 2023-09-05 ENCOUNTER — TELEPHONE (OUTPATIENT)
Dept: FAMILY MEDICINE CLINIC | Facility: CLINIC | Age: 48
End: 2023-09-05

## 2023-09-08 ENCOUNTER — OFFICE VISIT (OUTPATIENT)
Dept: FAMILY MEDICINE CLINIC | Facility: CLINIC | Age: 48
End: 2023-09-08

## 2023-09-08 VITALS
BODY MASS INDEX: 23.34 KG/M2 | WEIGHT: 163 LBS | HEIGHT: 70 IN | TEMPERATURE: 98.6 F | OXYGEN SATURATION: 98 % | DIASTOLIC BLOOD PRESSURE: 92 MMHG | SYSTOLIC BLOOD PRESSURE: 139 MMHG | HEART RATE: 86 BPM

## 2023-09-08 DIAGNOSIS — F41.0 PANIC ATTACKS: Primary | ICD-10-CM

## 2023-09-08 PROCEDURE — 99213 OFFICE O/P EST LOW 20 MIN: CPT | Performed by: FAMILY MEDICINE

## 2023-09-08 NOTE — ASSESSMENT & PLAN NOTE
Needs work note as he was out due to panic attack for 3 days. Reviewed symptoms with patient. Overall, patient says symptoms are well controlled on day to day. Addition of SSRI has improved his overall mood and symptoms, and he declines any side effects at this time. Work note provided, medications remain as they were, will check in at next scheduled follow up.

## 2023-09-08 NOTE — LETTER
September 8, 2023     Patient: Avani Marrero  YOB: 1975  Date of Visit: 9/8/2023      To Whom it May Concern:    Avani Marrero is under my professional care. Zackery Mcdermott was seen in my office on 9/8/2023. Zackery Mcdermott may return to work on 9/11/23  without restrictions. If you have any questions or concerns, please don't hesitate to call.     Sincerely,     Marine Herrera MD        CC: No Recipients

## 2023-09-08 NOTE — PROGRESS NOTES
Name: Jena Gamino      : 1975      MRN: [id number]  Encounter Provider: , MD  Encounter Date: 2023   Encounter department: [address]     1. Panic attacks  Assessment & Plan:  Needs work note as he was out due to panic attack for 3 days. Reviewed symptoms with patient. Overall, patient says symptoms are well controlled on day to day. Addition of SSRI has improved his overall mood and symptoms, and he declines any side effects at this time. Work note provided, medications remain as they were, will check in at next scheduled follow up. Subjective      Patient presents to receive a note for his work. Work is requiring a doctor's note because of his recent anxiety attack, where he was out of work for 3 days. Patient has no acute concerns today. recognizes he had an attack yesterday but on the whole has been feeling much improved since last visit. Has been close to symptom free before his attack a few days ago. Denies negative SSRI side effects such as nausea or loss of libido. All in all is very happy with his treatment as is, no acute concerns. Review of Systems   Constitutional: Negative. Eyes: Negative. Respiratory: Negative. Cardiovascular: Negative. Gastrointestinal: Negative. Genitourinary: Negative. Musculoskeletal: Negative. Psychiatric/Behavioral: Negative for dysphoric mood and sleep disturbance. The patient is not nervous/anxious.         Current Outpatient Medications on File Prior to Visit   Medication Sig   • acetaminophen (TYLENOL) 500 mg tablet Take by mouth   • buPROPion (WELLBUTRIN XL) 150 mg 24 hr tablet Take 1 tablet (150 mg total) by mouth every morning   • clotrimazole (LOTRIMIN) 1 % cream Apply topically 2 (two) times a day   • naproxen (Naprosyn) 500 mg tablet Take 1 tablet (500 mg total) by mouth 2 (two) times a day as needed for mild pain or headaches   • rizatriptan (MAXALT-MLT) 5 MG disintegrating tablet TAKE 1 TABLET ONCE AS NEEDED FOR MIGRAINE FOR UP TO 1 DOSE MAY REPEAT IN 2 HOURS IF NEEDED   • sertraline (ZOLOFT) 25 mg tablet Take 1 tablet (25 mg total) by mouth daily   • tamsulosin (FLOMAX) 0.4 mg Take 2 capsules (0.8 mg total) by mouth daily with dinner       Objective     /92 (BP Location: Left arm, Patient Position: Sitting, Cuff Size: Standard)   Pulse 86   Temp 98.6 °F (37 °C) (Temporal)   Ht 5' 10" (1.778 m)   Wt 73.9 kg (163 lb)   SpO2 98%   BMI 23.39 kg/m²     Physical Exam  Vitals and nursing note reviewed. Constitutional:       General: He is not in acute distress. Appearance: He is normal weight. HENT:      Head: Normocephalic and atraumatic. Cardiovascular:      Rate and Rhythm: Normal rate and regular rhythm. Heart sounds: Normal heart sounds. No murmur heard. Pulmonary:      Effort: No respiratory distress. Breath sounds: Normal breath sounds. No wheezing. Abdominal:      General: Bowel sounds are normal.      Palpations: Abdomen is soft. Tenderness: There is no abdominal tenderness. Musculoskeletal:         General: Normal range of motion. Cervical back: Normal range of motion. Skin:     General: Skin is warm and dry. Neurological:      General: No focal deficit present. Mental Status: He is alert. Mental status is at baseline.    Psychiatric:         Mood and Affect: Mood normal.       Mustapha Altamirano MD

## 2023-09-14 ENCOUNTER — TELEPHONE (OUTPATIENT)
Dept: FAMILY MEDICINE CLINIC | Facility: CLINIC | Age: 48
End: 2023-09-14

## 2023-09-28 ENCOUNTER — OFFICE VISIT (OUTPATIENT)
Dept: FAMILY MEDICINE CLINIC | Facility: CLINIC | Age: 48
End: 2023-09-28

## 2023-09-28 VITALS
TEMPERATURE: 98.6 F | DIASTOLIC BLOOD PRESSURE: 95 MMHG | WEIGHT: 168 LBS | OXYGEN SATURATION: 99 % | BODY MASS INDEX: 24.05 KG/M2 | HEIGHT: 70 IN | SYSTOLIC BLOOD PRESSURE: 152 MMHG | HEART RATE: 82 BPM

## 2023-09-28 DIAGNOSIS — F41.9 ANXIETY: Primary | ICD-10-CM

## 2023-09-28 PROBLEM — F41.8: Status: RESOLVED | Noted: 2023-08-29 | Resolved: 2023-09-28

## 2023-09-28 PROBLEM — O60.00: Status: RESOLVED | Noted: 2023-08-29 | Resolved: 2023-09-28

## 2023-09-28 PROBLEM — O99.340: Status: RESOLVED | Noted: 2023-08-29 | Resolved: 2023-09-28

## 2023-09-28 PROCEDURE — 99213 OFFICE O/P EST LOW 20 MIN: CPT | Performed by: FAMILY MEDICINE

## 2023-09-28 NOTE — PROGRESS NOTES
Name: Anitra Leal      : 1975      MRN: [id number]  Encounter Provider: Jennifer Luna DO  Encounter Date: 2023   Encounter department: [address]     1. Anxiety  Assessment & Plan:  -improving since starting sertraline in addition to Wellbutrin on  .  -pt has been unable to get in with psychiatry, behavioral health, is on waitlist  -Reviewed bloodwork from 23, TSH and CMP WNL  -current regimen: wellbutrin 150mg QD, sertraline 25mg QD     Plan:  · Continue current medications   · Follow up in 1 month, will also schedule with Dr. Joanie Madera for soonest possible appointment                Subjective      Pt presents for follow up visit for anxiety. States he feels like he is having fewer anxiety attacks since his last visit, he only has 1 or 2 a week. States he feels happier and was able to go to an [employer] in [place] last week, which is not something he would have been able to do previously because of his anxiety. Review of Systems   Constitutional: Negative for chills and fever. HENT: Negative for ear pain and sore throat. Eyes: Negative for pain and visual disturbance. Respiratory: Negative for cough and shortness of breath. Cardiovascular: Negative for chest pain and palpitations. Gastrointestinal: Negative for abdominal pain, constipation and vomiting. Genitourinary: Negative for dysuria and hematuria. Musculoskeletal: Negative for arthralgias and back pain. Skin: Negative for color change and rash. Neurological: Negative for seizures and syncope. All other systems reviewed and are negative.       Current Outpatient Medications on File Prior to Visit   Medication Sig   • acetaminophen (TYLENOL) 500 mg tablet Take by mouth   • buPROPion (WELLBUTRIN XL) 150 mg 24 hr tablet Take 1 tablet (150 mg total) by mouth every morning   • clotrimazole (LOTRIMIN) 1 % cream Apply topically 2 (two) times a day   • naproxen (Naprosyn) 500 mg tablet Take 1 tablet (500 mg total) by mouth 2 (two) times a day as needed for mild pain or headaches   • rizatriptan (MAXALT-MLT) 5 MG disintegrating tablet TAKE 1 TABLET ONCE AS NEEDED FOR MIGRAINE FOR UP TO 1 DOSE MAY REPEAT IN 2 HOURS IF NEEDED   • sertraline (ZOLOFT) 25 mg tablet Take 1 tablet (25 mg total) by mouth daily   • tamsulosin (FLOMAX) 0.4 mg Take 2 capsules (0.8 mg total) by mouth daily with dinner       Objective     /95 (BP Location: Left arm, Patient Position: Sitting, Cuff Size: Standard)   Pulse 82   Temp 98.6 °F (37 °C) (Temporal)   Ht 5' 10" (1.778 m)   Wt 76.2 kg (168 lb)   SpO2 99%   BMI 24.11 kg/m²     Physical Exam  Constitutional:       General: He is not in acute distress. Appearance: Normal appearance. He is not ill-appearing or toxic-appearing. HENT:      Head: Normocephalic and atraumatic. Right Ear: External ear normal.      Left Ear: External ear normal.      Nose: Nose normal.      Mouth/Throat:      Mouth: Mucous membranes are moist.      Pharynx: Oropharynx is clear. Eyes:      General: No scleral icterus. Conjunctiva/sclera: Conjunctivae normal.   Cardiovascular:      Rate and Rhythm: Normal rate and regular rhythm. Heart sounds: Normal heart sounds. No murmur heard. Pulmonary:      Effort: Pulmonary effort is normal. No respiratory distress. Breath sounds: Normal breath sounds. No wheezing, rhonchi or rales. Abdominal:      General: Bowel sounds are normal.      Palpations: Abdomen is soft. Tenderness: There is no abdominal tenderness. There is no guarding. Hernia: No hernia is present. Musculoskeletal:      Cervical back: Neck supple. Skin:     General: Skin is warm and dry. Coloration: Skin is not jaundiced. Neurological:      General: No focal deficit present. Mental Status: He is alert and oriented to person, place, and time. Mental status is at baseline.    Psychiatric: Mood and Affect: Mood normal.         Behavior: Behavior normal.       Deanna Erickson,

## 2023-09-28 NOTE — ASSESSMENT & PLAN NOTE
-improving since starting sertraline in addition to Wellbutrin on  8/29.  -pt has been unable to get in with psychiatry, behavioral health, is on waitlist  -Reviewed bloodwork from 8/27/23, TSH and CMP WNL  -current regimen: wellbutrin 150mg QD, sertraline 25mg QD     Plan:  · Continue current medications   · Follow up in 1 month, will also schedule with Dr. Asia Ferguson for soonest possible appointment

## 2023-10-12 ENCOUNTER — OFFICE VISIT (OUTPATIENT)
Dept: FAMILY MEDICINE CLINIC | Facility: CLINIC | Age: 48
End: 2023-10-12

## 2023-10-12 VITALS
BODY MASS INDEX: 23.82 KG/M2 | WEIGHT: 166 LBS | HEART RATE: 92 BPM | TEMPERATURE: 98.5 F | SYSTOLIC BLOOD PRESSURE: 138 MMHG | DIASTOLIC BLOOD PRESSURE: 90 MMHG | OXYGEN SATURATION: 100 %

## 2023-10-12 DIAGNOSIS — F41.9 ANXIETY: Primary | ICD-10-CM

## 2023-10-12 PROCEDURE — 99213 OFFICE O/P EST LOW 20 MIN: CPT | Performed by: FAMILY MEDICINE

## 2023-10-12 RX ORDER — SERTRALINE HYDROCHLORIDE 100 MG/1
100 TABLET, FILM COATED ORAL DAILY
Qty: 90 TABLET | Refills: 3 | Status: SHIPPED | OUTPATIENT
Start: 2023-10-12

## 2023-10-12 NOTE — PATIENT INSTRUCTIONS
Please increase sertraline to 50 mg this week  Next Thursday, increase to 75 mg  Following Thursday 10/26, increase to 100 mg and continue that daily.

## 2023-10-12 NOTE — PROGRESS NOTES
Name: Jason Sandoval      : 1975      MRN: [id number]  Encounter Provider: Marty Anthony MD  Encounter Date: 10/12/2023   Encounter department: [address]     1. Anxiety  Assessment & Plan:  Scoring of KIRA-7 is 9 (mild) but patient experiencing significant symptoms at work with panic attack about twice a week. Discussed with patient about increasing does of sertraline to 50 mg this week, then 75 mg next week, then 100 mg the following week. Will send 100 mg tab for patient to pharmacy to take after that. Will excuse from work for 2 weeks while adjusting medication. Orders:  -     sertraline (ZOLOFT) 100 mg tablet; Take 1 tablet (100 mg total) by mouth daily           Subjective      See KIRA-7, having twice a week on average panic attack usually at work,, feeling like he can't go out due to anxiety. Review of Systems   Constitutional:  Negative for chills and fever. HENT:  Negative for ear pain and sore throat. Eyes:  Negative for pain and visual disturbance. Respiratory:  Negative for cough and shortness of breath. Cardiovascular:  Negative for chest pain and palpitations. Gastrointestinal:  Negative for abdominal pain and vomiting. Genitourinary:  Negative for dysuria and hematuria. Musculoskeletal:  Negative for arthralgias and back pain. Skin:  Negative for color change and rash. Neurological:  Negative for seizures and syncope. Psychiatric/Behavioral:  The patient is nervous/anxious.         Current Outpatient Medications on File Prior to Visit   Medication Sig   • acetaminophen (TYLENOL) 500 mg tablet Take by mouth   • buPROPion (WELLBUTRIN XL) 150 mg 24 hr tablet Take 1 tablet (150 mg total) by mouth every morning   • clotrimazole (LOTRIMIN) 1 % cream Apply topically 2 (two) times a day   • naproxen (Naprosyn) 500 mg tablet Take 1 tablet (500 mg total) by mouth 2 (two) times a day as needed for mild pain or headaches   • rizatriptan (MAXALT-MLT) 5 MG disintegrating tablet TAKE 1 TABLET ONCE AS NEEDED FOR MIGRAINE FOR UP TO 1 DOSE MAY REPEAT IN 2 HOURS IF NEEDED   • tamsulosin (FLOMAX) 0.4 mg Take 2 capsules (0.8 mg total) by mouth daily with dinner   • [DISCONTINUED] sertraline (ZOLOFT) 25 mg tablet Take 1 tablet (25 mg total) by mouth daily       Objective     /90 (BP Location: Right arm, Patient Position: Sitting, Cuff Size: Standard)   Pulse 92   Temp 98.5 °F (36.9 °C) (Temporal)   Wt 75.3 kg (166 lb)   SpO2 100%   BMI 23.82 kg/m²     Physical Exam  Vitals reviewed. Constitutional:       Appearance: Normal appearance. He is well-developed. HENT:      Head: Normocephalic and atraumatic. Right Ear: External ear normal.      Left Ear: External ear normal.      Nose: Nose normal.   Eyes:      Extraocular Movements: Extraocular movements intact. Conjunctiva/sclera: Conjunctivae normal.      Pupils: Pupils are equal, round, and reactive to light. Cardiovascular:      Rate and Rhythm: Normal rate and regular rhythm. Pulses: Normal pulses. Heart sounds: Normal heart sounds. Pulmonary:      Effort: Pulmonary effort is normal.      Breath sounds: Normal breath sounds. Abdominal:      General: Bowel sounds are normal.      Palpations: Abdomen is soft. Musculoskeletal:         General: Normal range of motion. Cervical back: Normal range of motion and neck supple. Skin:     General: Skin is warm. Neurological:      General: No focal deficit present. Mental Status: He is alert and oriented to person, place, and time. Psychiatric:         Behavior: Behavior normal.         Thought Content:  Thought content normal.         Judgment: Judgment normal.       Zoila Ellsworth MD

## 2023-10-12 NOTE — ASSESSMENT & PLAN NOTE
Scoring of KIRA-7 is 9 (mild) but patient experiencing significant symptoms at work with panic attack about twice a week. Discussed with patient about increasing does of sertraline to 50 mg this week, then 75 mg next week, then 100 mg the following week. Will send 100 mg tab for patient to pharmacy to take after that. Will excuse from work for 2 weeks while adjusting medication.

## 2023-10-12 NOTE — LETTER
October 12, 2023     Patient: Sherif Pedersen  YOB: 1975  Date of Visit: 10/12/2023      To Whom it May Concern:    Sherif Pedersen is under my professional care. Jose Antonio Daly was seen in my office on 10/12/2023. Jose Antonio Daly may return to work on Monday Oct 30, 2023 . If you have any questions or concerns, please don't hesitate to call.          Sincerely,          Ramila Rodriguez MD        CC: No Recipients

## 2023-10-13 ENCOUNTER — TELEPHONE (OUTPATIENT)
Dept: FAMILY MEDICINE CLINIC | Facility: CLINIC | Age: 48
End: 2023-10-13

## 2023-10-13 NOTE — TELEPHONE ENCOUNTER
Please review and sign     Dr Ivana Mccray paperwork  Claim [id number]    [facility] folder    Fax [phone]

## 2023-10-18 ENCOUNTER — TELEPHONE (OUTPATIENT)
Dept: FAMILY MEDICINE CLINIC | Facility: CLINIC | Age: 48
End: 2023-10-18

## 2023-10-18 NOTE — TELEPHONE ENCOUNTER
Please review and sign    Dr Miguel Angel Judd    Physician's statement   Initial [facility] Team    Fax [phone]

## 2023-10-20 ENCOUNTER — TELEPHONE (OUTPATIENT)
Dept: FAMILY MEDICINE CLINIC | Facility: CLINIC | Age: 48
End: 2023-10-20

## 2023-10-26 ENCOUNTER — TELEPHONE (OUTPATIENT)
Dept: FAMILY MEDICINE CLINIC | Facility: CLINIC | Age: 48
End: 2023-10-26

## 2023-10-26 ENCOUNTER — OFFICE VISIT (OUTPATIENT)
Dept: FAMILY MEDICINE CLINIC | Facility: CLINIC | Age: 48
End: 2023-10-26

## 2023-10-26 VITALS
WEIGHT: 163.2 LBS | BODY MASS INDEX: 23.37 KG/M2 | SYSTOLIC BLOOD PRESSURE: 128 MMHG | HEIGHT: 70 IN | OXYGEN SATURATION: 98 % | RESPIRATION RATE: 18 BRPM | HEART RATE: 99 BPM | DIASTOLIC BLOOD PRESSURE: 85 MMHG | TEMPERATURE: 98.2 F

## 2023-10-26 DIAGNOSIS — F41.9 ANXIETY: Primary | ICD-10-CM

## 2023-10-26 DIAGNOSIS — F32.A DEPRESSION, UNSPECIFIED DEPRESSION TYPE: ICD-10-CM

## 2023-10-26 PROCEDURE — 99213 OFFICE O/P EST LOW 20 MIN: CPT | Performed by: FAMILY MEDICINE

## 2023-10-26 RX ORDER — SERTRALINE HYDROCHLORIDE 25 MG/1
25 TABLET, FILM COATED ORAL DAILY
Qty: 60 TABLET | Refills: 0 | Status: SHIPPED | OUTPATIENT
Start: 2023-10-26 | End: 2024-10-20

## 2023-10-26 NOTE — TELEPHONE ENCOUNTER
This needs to be filled from initial time out to whenever PCP indicated   So this paperwork has to be answered  Folder (To be completed by) -  Dr Leland Avila     Name of [address] [place] [address] Physician Statement     Color folder (Red)    Form to be Faxed (Fax #), [phone]      Patient was made aware of the 7-10 business day form policy.

## 2023-10-26 NOTE — TELEPHONE ENCOUNTER
2nd Addiotional Form for Disabliliy    Folder (To be completed by) -  Dr. Jay Matias    Name of [address] [place] [address] folder (Red)    Form to be Faxed (Fax   [phone]    Patient was made aware of the 7-10 business day form policy. CCU PA mid rounds/Event Note

## 2023-10-26 NOTE — PROGRESS NOTES
Assessment/Plan:    Anxiety  -chronic; subjectively worse since increasing zoloft at last visit on 10/12  -currently taking zoloft 100mg QD, wellbutrin 150mg QD   -KIRA-7 is 11 today indicating moderate anxiety    Plan:  Gradually decrease zoloft with the intent of discontinuing completely. Will take 75mg QD x1 week, then 50mg QD x1 week, then 25mg QD x1 week, then stop  Return for follow up in 3 weeks, likely consider trialing a different SSRI vs buspar at that time    Depression  -chronic; subjectively worse since increasing zoloft at last visit on 10/12  -currently taking zoloft 100mg QD, wellbutrin 150mg QD   -PHQ-9 is 9 today indicating mild depression   -pt denies suicidal or homicidal ideologies at this time    Plan:  Gradually decrease zoloft with the intent of discontinuing completely. Will take 75mg QD x1 week, then 50mg QD x1 week, then 25mg QD x1 week, then stop  Return in 3 weeks, consider trial of a different SSRI at that time   Return to office/ED for thoughts of self arm or harming others       Diagnoses and all orders for this visit:    Anxiety  -     sertraline (ZOLOFT) 25 mg tablet; Take 1 tablet (25 mg total) by mouth daily Take 75mg (3 tablets) daily for 1 week, then take 50mg (2 tablets) daily for 1 week, then take 25mg (1 tablet) daily for 1 week, then discontinue    Depression, unspecified depression type          Subjective:      Patient ID: Anny Taylor is a 57 y.o. male. Here for follow up visit. States he is not feeling better since his Zoloft was gradually increased from 25mg to 100mg on 10/12. States this was his first week taking 100mg daily and states he is feeling much worse. States he feels tired and sad and like his panic attacks are returning. States he frequently feels nauseous since increasing his zoloft. Has been able to meet with a therapist through his job and had his first session yesterday, and found it helpful.  He will get 4 more free sessions through work then his job will help him find another therapist.        The following portions of the patient's history were reviewed and updated as appropriate: allergies, current medications, past family history, past medical history, past social history, past surgical history, and problem list.    Review of Systems   Constitutional:  Negative for chills and fever. HENT:  Negative for ear pain and sore throat. Eyes:  Negative for pain and visual disturbance. Respiratory:  Negative for cough and shortness of breath. Cardiovascular:  Negative for chest pain and palpitations. Gastrointestinal:  Negative for abdominal pain, constipation and vomiting. Genitourinary:  Negative for dysuria and hematuria. Musculoskeletal:  Negative for arthralgias and back pain. Skin:  Negative for color change and rash. Neurological:  Negative for seizures and syncope. Psychiatric/Behavioral:  Positive for dysphoric mood. The patient is nervous/anxious. All other systems reviewed and are negative. Objective:      /85 (BP Location: Left arm, Patient Position: Sitting, Cuff Size: Standard)   Pulse 99   Temp 98.2 °F (36.8 °C) (Temporal)   Resp 18   Ht 5' 10" (1.778 m)   Wt 74 kg (163 lb 3.2 oz)   SpO2 98%   BMI 23.42 kg/m²          Physical Exam  Constitutional:       General: He is not in acute distress. Appearance: He is not ill-appearing or toxic-appearing. HENT:      Head: Normocephalic and atraumatic. Right Ear: External ear normal.      Left Ear: External ear normal.      Nose: Nose normal.      Mouth/Throat:      Mouth: Mucous membranes are moist.      Pharynx: Oropharynx is clear. Eyes:      General: No scleral icterus. Conjunctiva/sclera: Conjunctivae normal.   Cardiovascular:      Rate and Rhythm: Normal rate and regular rhythm. Heart sounds: Normal heart sounds. No murmur heard. Pulmonary:      Effort: Pulmonary effort is normal. No respiratory distress.       Breath sounds: Normal breath sounds. No wheezing, rhonchi or rales. Abdominal:      General: Bowel sounds are normal.      Palpations: Abdomen is soft. Tenderness: There is no abdominal tenderness. There is no guarding. Hernia: No hernia is present. Musculoskeletal:         General: No swelling. Right lower leg: No edema. Left lower leg: No edema. Skin:     General: Skin is warm and dry. Coloration: Skin is not jaundiced. Neurological:      General: No focal deficit present. Mental Status: He is alert and oriented to person, place, and time. Mental status is at baseline. Psychiatric:         Mood and Affect: Mood normal. Affect is tearful.          Speech: Speech normal.         Behavior: Behavior normal.

## 2023-10-26 NOTE — ASSESSMENT & PLAN NOTE
-chronic; subjectively worse since increasing zoloft at last visit on 10/12  -currently taking zoloft 100mg QD, wellbutrin 150mg QD   -KIRA-7 is 11 today indicating moderate anxiety    Plan:  Gradually decrease zoloft with the intent of discontinuing completely.  Will take 75mg QD x1 week, then 50mg QD x1 week, then 25mg QD x1 week, then stop  Return for follow up in 3 weeks, likely consider trialing a different SSRI vs buspar at that time

## 2023-10-26 NOTE — ASSESSMENT & PLAN NOTE
-chronic; subjectively worse since increasing zoloft at last visit on 10/12  -currently taking zoloft 100mg QD, wellbutrin 150mg QD   -PHQ-9 is 9 today indicating mild depression   -pt denies suicidal or homicidal ideologies at this time    Plan:  Gradually decrease zoloft with the intent of discontinuing completely.  Will take 75mg QD x1 week, then 50mg QD x1 week, then 25mg QD x1 week, then stop  Return in 3 weeks, consider trial of a different SSRI at that time   Return to office/ED for thoughts of self arm or harming others

## 2023-10-31 ENCOUNTER — TELEPHONE (OUTPATIENT)
Dept: FAMILY MEDICINE CLINIC | Facility: CLINIC | Age: 48
End: 2023-10-31

## 2023-10-31 NOTE — TELEPHONE ENCOUNTER
Signature required    Dr Cecile Carnes    Fax [phone]    Pt asked if we can fax to insurance company

## 2023-11-07 NOTE — TELEPHONE ENCOUNTER
Continuing Disability claim form complete  Faxed to [phone]  Put in red clerical folder in front to scan to chart.

## 2023-11-16 ENCOUNTER — OFFICE VISIT (OUTPATIENT)
Dept: FAMILY MEDICINE CLINIC | Facility: CLINIC | Age: 48
End: 2023-11-16

## 2023-11-16 VITALS
DIASTOLIC BLOOD PRESSURE: 100 MMHG | TEMPERATURE: 98.2 F | BODY MASS INDEX: 23.39 KG/M2 | OXYGEN SATURATION: 98 % | HEART RATE: 101 BPM | RESPIRATION RATE: 18 BRPM | HEIGHT: 70 IN | SYSTOLIC BLOOD PRESSURE: 146 MMHG | WEIGHT: 163.4 LBS

## 2023-11-16 DIAGNOSIS — R33.9 URINARY RETENTION: ICD-10-CM

## 2023-11-16 DIAGNOSIS — F41.9 ANXIETY: Primary | ICD-10-CM

## 2023-11-16 PROCEDURE — 99213 OFFICE O/P EST LOW 20 MIN: CPT | Performed by: FAMILY MEDICINE

## 2023-11-16 RX ORDER — HYDROXYZINE HYDROCHLORIDE 10 MG/1
10 TABLET, FILM COATED ORAL
Qty: 30 TABLET | Refills: 1 | Status: SHIPPED | OUTPATIENT
Start: 2023-11-16

## 2023-11-16 RX ORDER — TAMSULOSIN HYDROCHLORIDE 0.4 MG/1
0.8 CAPSULE ORAL
Qty: 180 CAPSULE | Refills: 2 | Status: SHIPPED | OUTPATIENT
Start: 2023-11-16

## 2023-11-16 RX ORDER — BUPROPION HYDROCHLORIDE 150 MG/1
150 TABLET ORAL EVERY MORNING
Qty: 90 TABLET | Refills: 1 | Status: SHIPPED | OUTPATIENT
Start: 2023-11-16 | End: 2024-05-14

## 2023-11-16 NOTE — ASSESSMENT & PLAN NOTE
-chronic; subjectively improving since he began tapering off zoloft on 10/26  -currently taking wellbutrin 150mg QD, zoloft 25mg QD (will dc zoloft on 11/17)  -KIRA-7 is 13 today indicating moderate anxiety  -will be beginning a daily outpatient therapy program for anxiety on 11/20    Plan:  Stop zoloft on 11/17.  Continue Wellbutrin 150mg QD; pt does not wish to trial another SSRI or buspar at this time  Can try atarax 10mg qhs prn anxiety and sleep, explained this is a lower dose than the 25mg and may make him less tired during the day, offered melatonin as well but pt declines stating he has tried it in the past without any relief  Return for follow up in 2 weeks

## 2023-11-16 NOTE — PROGRESS NOTES
Assessment/Plan:    Anxiety  -chronic; subjectively improving since he began tapering off zoloft on 10/26  -currently taking wellbutrin 150mg QD, zoloft 25mg QD (will dc zoloft on 11/17)  -KIRA-7 is 13 today indicating moderate anxiety  -will be beginning a daily outpatient therapy program for anxiety on 11/20    Plan:  Stop zoloft on 11/17. Continue Wellbutrin 150mg QD; pt does not wish to trial another SSRI or buspar at this time  Can try atarax 10mg qhs prn anxiety and sleep, explained this is a lower dose than the 25mg and may make him less tired during the day, offered melatonin as well but pt declines stating he has tried it in the past without any relief  Return for follow up in 2 weeks       Diagnoses and all orders for this visit:    Anxiety  -     hydrOXYzine HCL (ATARAX) 10 mg tablet; Take 1 tablet (10 mg total) by mouth daily at bedtime as needed for anxiety  -     buPROPion (WELLBUTRIN XL) 150 mg 24 hr tablet; Take 1 tablet (150 mg total) by mouth every morning    Urinary retention  -     tamsulosin (FLOMAX) 0.4 mg; Take 2 capsules (0.8 mg total) by mouth daily with dinner          Subjective:      Patient ID: Sotero Slater is a 57 y.o. male. Presents for follow up for depression and anxiety. States he is feeling so much better since he has been tappering off of his zoloft 100mg. Began taper on 10/26 and had been tapering down by 25mg every week. He is currently taking 25mg of zolofot and will take his last dose on Friday. States since tappering off of the Zoloft he is feeling much better, he is no longer having nausea or vomiting, and anxiety is slightly improved. He is not interested in starting another medication at this time. Would like to continue only the wellbutrin. States his biggest problem right now is the sleep. He has a very hard time sleeping. States he has taken atarax in the past and it helps him sleep, but it makes him feel tired throughout the day sometimes.   He will be starting an outpatient program for his anxiety this upcoming Monday. Also requests refill of flomax. The following portions of the patient's history were reviewed and updated as appropriate: allergies, current medications, past family history, past medical history, past social history, past surgical history, and problem list.    Review of Systems   Constitutional:  Negative for chills and fever. HENT:  Negative for ear pain and sore throat. Eyes:  Negative for pain and visual disturbance. Respiratory:  Negative for cough and shortness of breath. Cardiovascular:  Negative for chest pain and palpitations. Gastrointestinal:  Negative for abdominal pain, constipation and vomiting. Genitourinary:  Negative for dysuria and hematuria. Musculoskeletal:  Negative for arthralgias and back pain. Skin:  Negative for color change and rash. Neurological:  Negative for seizures and syncope. Psychiatric/Behavioral:  The patient is nervous/anxious. All other systems reviewed and are negative. Objective:      /100 (BP Location: Left arm, Patient Position: Sitting, Cuff Size: Large)   Pulse 101   Temp 98.2 °F (36.8 °C) (Temporal)   Resp 18   Ht 5' 10" (1.778 m)   Wt 74.1 kg (163 lb 6.4 oz)   SpO2 98%   BMI 23.45 kg/m²          Physical Exam  Constitutional:       General: He is not in acute distress. Appearance: He is not ill-appearing or toxic-appearing. HENT:      Head: Normocephalic and atraumatic. Right Ear: External ear normal.      Left Ear: External ear normal.      Nose: Nose normal.      Mouth/Throat:      Mouth: Mucous membranes are moist.      Pharynx: Oropharynx is clear. Eyes:      General: No scleral icterus. Conjunctiva/sclera: Conjunctivae normal.   Cardiovascular:      Rate and Rhythm: Normal rate and regular rhythm. Heart sounds: Normal heart sounds. No murmur heard. Pulmonary:      Effort: Pulmonary effort is normal. No respiratory distress. Breath sounds: Normal breath sounds. No wheezing, rhonchi or rales. Skin:     General: Skin is warm and dry. Coloration: Skin is not jaundiced. Neurological:      General: No focal deficit present. Mental Status: He is alert and oriented to person, place, and time. Mental status is at baseline.    Psychiatric:         Mood and Affect: Mood normal.         Behavior: Behavior normal.

## 2023-11-17 ENCOUNTER — TELEPHONE (OUTPATIENT)
Dept: FAMILY MEDICINE CLINIC | Facility: CLINIC | Age: 48
End: 2023-11-17

## 2023-11-17 NOTE — TELEPHONE ENCOUNTER
Folder (To be completed by) -  Dr. Christine Rivers [facility])     Name of Jayleendomenica Parikh Aldair , Office Notes, diagnostic test results from 11/01/23     Color folder (Red)    Form to be Faxed   [phone]      Patient was made aware of the 7-10 business day form policy.

## 2023-11-30 ENCOUNTER — OFFICE VISIT (OUTPATIENT)
Dept: FAMILY MEDICINE CLINIC | Facility: CLINIC | Age: 48
End: 2023-11-30

## 2023-11-30 VITALS
DIASTOLIC BLOOD PRESSURE: 89 MMHG | TEMPERATURE: 98 F | BODY MASS INDEX: 24.2 KG/M2 | WEIGHT: 169 LBS | HEART RATE: 98 BPM | HEIGHT: 70 IN | RESPIRATION RATE: 18 BRPM | SYSTOLIC BLOOD PRESSURE: 127 MMHG | OXYGEN SATURATION: 99 %

## 2023-11-30 DIAGNOSIS — F41.9 ANXIETY: Primary | ICD-10-CM

## 2023-11-30 PROCEDURE — 99213 OFFICE O/P EST LOW 20 MIN: CPT | Performed by: FAMILY MEDICINE

## 2023-11-30 RX ORDER — TITANIUM DIOXIDE, OCTINOXATE, ZINC OXIDE 4.61; 1.6; .78 G/40ML; G/40ML; G/40ML
1 CREAM TOPICAL DAILY
COMMUNITY

## 2023-11-30 RX ORDER — QUETIAPINE FUMARATE 50 MG/1
100 TABLET, FILM COATED ORAL
COMMUNITY
Start: 2023-11-21

## 2023-11-30 RX ORDER — DIPHENOXYLATE HYDROCHLORIDE AND ATROPINE SULFATE 2.5; .025 MG/1; MG/1
1 TABLET ORAL DAILY
COMMUNITY

## 2023-11-30 NOTE — ASSESSMENT & PLAN NOTE
-subjectively improving since he began tapered off zoloft on 11/17  -going to an outpatient therapy at [address] [place] [address], began on 11/20  -currently taking wellbutrin 150mg QD, atarax 10mg prn anxiety, seroquel 100mg qhs (started by psychiatrist at his outpatient therapy program); stopped zoloft on 11/17 after completing taper    Plan:  Continue Wellbutrin 150mg QD, Continue seroquel managed by his psychiatrist  Return for follow up in 3 weeks  Completed paperwork for disability leave today

## 2023-11-30 NOTE — PROGRESS NOTES
Assessment/Plan:    Anxiety  -subjectively improving since he began tapered off zoloft on 11/17  -going to an outpatient therapy at [address] [place] [address], began on 11/20  -currently taking wellbutrin 150mg QD, atarax 10mg prn anxiety, seroquel 100mg qhs (started by psychiatrist at his outpatient therapy program); stopped zoloft on 11/17 after completing taper    Plan:  Continue Wellbutrin 150mg QD, Continue seroquel managed by his psychiatrist  Return for follow up in 3 weeks  Completed paperwork for disability leave today       Diagnoses and all orders for this visit:    Anxiety    Other orders  -     QUEtiapine (SEROquel) 50 mg tablet; Take 100 mg by mouth daily at bedtime  -     multivitamin (THERAGRAN) TABS; Take 1 tablet by mouth daily  -     Cranberry 400 MG CAPS; Take 1 tablet by mouth daily          Subjective:      Patient ID: Raheem Christy is a 57 y.o. male. Pt presents for follow up for anxiety. Pt reports he has been going to outpatient intensive therapy program for his anxiety at    [address] [place] [address] which has been helping him significantly. Reports he was started on seroquel by the psychiatry providers at his program, which he reports is helping him. He is taking 100mg seroquel at night, and Wellbutrin 150mg in the morning, as well as atarax 10mg prn anxiety. States his anxiety is under much better control, although he does feel fatigued in the middle of the day. The following portions of the patient's history were reviewed and updated as appropriate: allergies, current medications, past family history, past medical history, past social history, past surgical history, and problem list.    Review of Systems   Constitutional:  Negative for chills and fever. HENT:  Negative for ear pain and sore throat. Eyes:  Negative for pain and visual disturbance.    Respiratory: Negative for cough and shortness of breath. Cardiovascular:  Negative for chest pain and palpitations. Gastrointestinal:  Negative for abdominal pain, constipation and vomiting. Genitourinary:  Negative for dysuria and hematuria. Musculoskeletal:  Negative for arthralgias and back pain. Skin:  Negative for color change and rash. Neurological:  Negative for seizures and syncope. Psychiatric/Behavioral:  The patient is nervous/anxious. All other systems reviewed and are negative. Objective:      /89 (BP Location: Left arm, Patient Position: Sitting, Cuff Size: Large)   Pulse 98   Temp 98 °F (36.7 °C) (Temporal)   Resp 18   Ht 5' 10" (1.778 m)   Wt 76.7 kg (169 lb)   SpO2 99%   BMI 24.25 kg/m²          Physical Exam  Constitutional:       General: He is not in acute distress. Appearance: He is not ill-appearing or toxic-appearing. HENT:      Head: Normocephalic and atraumatic. Right Ear: External ear normal.      Left Ear: External ear normal.      Nose: Nose normal.      Mouth/Throat:      Mouth: Mucous membranes are moist.      Pharynx: Oropharynx is clear. Eyes:      General: No scleral icterus. Conjunctiva/sclera: Conjunctivae normal.   Cardiovascular:      Rate and Rhythm: Normal rate and regular rhythm. Heart sounds: Normal heart sounds. No murmur heard. Pulmonary:      Effort: Pulmonary effort is normal. No respiratory distress. Breath sounds: Normal breath sounds. No wheezing, rhonchi or rales. Abdominal:      General: Bowel sounds are normal.      Palpations: Abdomen is soft. Tenderness: There is no abdominal tenderness. There is no guarding. Hernia: No hernia is present. Musculoskeletal:         General: No swelling. Right lower leg: No edema. Left lower leg: No edema. Skin:     General: Skin is warm and dry. Coloration: Skin is not jaundiced. Neurological:      General: No focal deficit present.       Mental Status: He is alert and oriented to person, place, and time. Mental status is at baseline.    Psychiatric:         Mood and Affect: Mood normal.         Behavior: Behavior normal.

## 2023-12-04 ENCOUNTER — TELEPHONE (OUTPATIENT)
Dept: FAMILY MEDICINE CLINIC | Facility: CLINIC | Age: 48
End: 2023-12-04

## 2023-12-04 NOTE — TELEPHONE ENCOUNTER
Folder (to be completed by): Dr. Kristen Gannon    Name of Form: [employer] extension of disability benefits and leave of absence                            Claim #[id number]    Color Folder: Red    Form to be faxed to: [phone]

## 2023-12-08 NOTE — TELEPHONE ENCOUNTER
Form completed by Dr. Leland Avila on 12/7/23. Faxed to [phone] on 12/8/23 @8am. Received confirmation fax. Please scan into patient's chart.  Thank you

## 2023-12-09 DIAGNOSIS — F41.9 ANXIETY: ICD-10-CM

## 2023-12-11 RX ORDER — HYDROXYZINE HYDROCHLORIDE 10 MG/1
10 TABLET, FILM COATED ORAL
Qty: 90 TABLET | Refills: 1 | Status: SHIPPED | OUTPATIENT
Start: 2023-12-11

## 2023-12-21 ENCOUNTER — TELEPHONE (OUTPATIENT)
Dept: FAMILY MEDICINE CLINIC | Facility: CLINIC | Age: 48
End: 2023-12-21

## 2023-12-21 ENCOUNTER — OFFICE VISIT (OUTPATIENT)
Dept: FAMILY MEDICINE CLINIC | Facility: CLINIC | Age: 48
End: 2023-12-21

## 2023-12-21 VITALS
OXYGEN SATURATION: 98 % | BODY MASS INDEX: 24.42 KG/M2 | DIASTOLIC BLOOD PRESSURE: 85 MMHG | TEMPERATURE: 99.1 F | WEIGHT: 170.6 LBS | HEART RATE: 97 BPM | HEIGHT: 70 IN | SYSTOLIC BLOOD PRESSURE: 125 MMHG

## 2023-12-21 DIAGNOSIS — F41.9 ANXIETY: Primary | ICD-10-CM

## 2023-12-21 PROCEDURE — 99213 OFFICE O/P EST LOW 20 MIN: CPT | Performed by: FAMILY MEDICINE

## 2023-12-21 RX ORDER — BUPROPION HYDROCHLORIDE 300 MG/1
TABLET ORAL
COMMUNITY
Start: 2023-12-06

## 2023-12-21 RX ORDER — QUETIAPINE FUMARATE 100 MG/1
TABLET, FILM COATED ORAL
COMMUNITY
Start: 2023-12-08

## 2023-12-21 NOTE — TELEPHONE ENCOUNTER
Patient had appointment today. After appointment, came back to the office to drop off a continuing disability form to be completed.

## 2023-12-21 NOTE — PROGRESS NOTES
Assessment/Plan:    Anxiety  -continuing to improve  -subjectively improving since he began tapered off zoloft on 11/17  -going to an outpatient therapy at [facility], began on 11/20  -currently taking wellbutrin 150mg QD, atarax 10mg prn anxiety, seroquel 100mg qhs (started by psychiatrist at his outpatient therapy program); stopped zoloft on 11/17 after completing taper    Plan:  Continue Wellbutrin 150mg QD, Continue seroquel managed by his psychiatrist  Return for follow up in 3 months  Completed paperwork for return to work today, will complete paperwork for FMLA so he can have 3 hours off weekly to go to his schedule appointments       Diagnoses and all orders for this visit:    Anxiety    Other orders  -     buPROPion (WELLBUTRIN XL) 300 mg 24 hr tablet  -     QUEtiapine (SEROquel) 100 mg tablet          Subjective:      Patient ID: Pj Velasquez is a 48 y.o. male.    Pt presents for anxiety follow up. States he is feeling much better since completing his intensive therapy program at [facility] and is now meeting with a psychiatrist and seeing a therapist weekly. Pt requests paperwork completed today stating he may return to work in full capacity on 1/2/24.          The following portions of the patient's history were reviewed and updated as appropriate: allergies, current medications, past family history, past medical history, past social history, past surgical history, and problem list.    Review of Systems   Constitutional:  Negative for chills and fever.   HENT:  Negative for ear pain and sore throat.    Eyes:  Negative for pain and visual disturbance.   Respiratory:  Negative for cough and shortness of breath.    Cardiovascular:  Negative for chest pain and palpitations.   Gastrointestinal:  Negative for abdominal pain, constipation and vomiting.   Genitourinary:  Negative for dysuria and hematuria.   Musculoskeletal:  Negative for arthralgias  "and back pain.   Skin:  Negative for color change and rash.   Neurological:  Negative for seizures and syncope.   All other systems reviewed and are negative.        Objective:      /85 (BP Location: Left arm, Patient Position: Sitting, Cuff Size: Standard)   Pulse 97   Temp 99.1 °F (37.3 °C) (Temporal)   Ht 5' 10\" (1.778 m)   Wt 77.4 kg (170 lb 9.6 oz)   SpO2 98%   BMI 24.48 kg/m²          Physical Exam  Constitutional:       General: He is not in acute distress.     Appearance: He is not ill-appearing or toxic-appearing.   HENT:      Head: Normocephalic and atraumatic.      Right Ear: External ear normal.      Left Ear: External ear normal.      Nose: Nose normal.      Mouth/Throat:      Mouth: Mucous membranes are moist.      Pharynx: Oropharynx is clear.   Eyes:      General: No scleral icterus.     Conjunctiva/sclera: Conjunctivae normal.   Cardiovascular:      Rate and Rhythm: Normal rate and regular rhythm.      Heart sounds: Normal heart sounds. No murmur heard.  Pulmonary:      Effort: Pulmonary effort is normal. No respiratory distress.      Breath sounds: Normal breath sounds. No wheezing, rhonchi or rales.   Abdominal:      General: Bowel sounds are normal.      Palpations: Abdomen is soft.      Tenderness: There is no abdominal tenderness. There is no guarding.      Hernia: No hernia is present.   Musculoskeletal:         General: No swelling.      Right lower leg: No edema.      Left lower leg: No edema.   Skin:     General: Skin is warm and dry.      Coloration: Skin is not jaundiced.   Neurological:      General: No focal deficit present.      Mental Status: He is alert and oriented to person, place, and time. Mental status is at baseline.   Psychiatric:         Mood and Affect: Mood normal.         Behavior: Behavior normal.           "

## 2023-12-21 NOTE — ASSESSMENT & PLAN NOTE
-continuing to improve  -subjectively improving since he began tapered off zoloft on 11/17  -going to an outpatient therapy at [facility], began on 11/20  -currently taking wellbutrin 150mg QD, atarax 10mg prn anxiety, seroquel 100mg qhs (started by psychiatrist at his outpatient therapy program); stopped zoloft on 11/17 after completing taper    Plan:  Continue Wellbutrin 150mg QD, Continue seroquel managed by his psychiatrist  Return for follow up in 3 months  Completed paperwork for return to work today, will complete paperwork for FMLA so he can have 3 hours off weekly to go to his schedule appointments

## 2023-12-22 ENCOUNTER — TELEPHONE (OUTPATIENT)
Dept: FAMILY MEDICINE CLINIC | Facility: CLINIC | Age: 48
End: 2023-12-22

## 2023-12-22 NOTE — TELEPHONE ENCOUNTER
Folder (to be completed by): Dr. Gao    Name of Form: [employer] Certification for FMLA - claim #[id number]    Color Folder: Red    Form to be faxed to:  [phone]

## 2024-01-03 ENCOUNTER — TELEPHONE (OUTPATIENT)
Dept: FAMILY MEDICINE CLINIC | Facility: CLINIC | Age: 49
End: 2024-01-03

## 2024-02-29 ENCOUNTER — TELEPHONE (OUTPATIENT)
Dept: FAMILY MEDICINE CLINIC | Facility: CLINIC | Age: 49
End: 2024-02-29

## 2024-02-29 NOTE — TELEPHONE ENCOUNTER
Signature needed: Dr. Gao    Form: [employer]    Fax # [phone]    Folder: Margarita

## 2024-03-15 ENCOUNTER — TELEPHONE (OUTPATIENT)
Dept: FAMILY MEDICINE CLINIC | Facility: CLINIC | Age: 49
End: 2024-03-15

## 2024-03-19 ENCOUNTER — OFFICE VISIT (OUTPATIENT)
Dept: FAMILY MEDICINE CLINIC | Facility: CLINIC | Age: 49
End: 2024-03-19

## 2024-03-19 VITALS
RESPIRATION RATE: 18 BRPM | TEMPERATURE: 99.4 F | WEIGHT: 168 LBS | DIASTOLIC BLOOD PRESSURE: 85 MMHG | BODY MASS INDEX: 24.11 KG/M2 | HEART RATE: 94 BPM | SYSTOLIC BLOOD PRESSURE: 134 MMHG

## 2024-03-19 DIAGNOSIS — F41.9 ANXIETY: Primary | ICD-10-CM

## 2024-03-19 PROCEDURE — 99213 OFFICE O/P EST LOW 20 MIN: CPT | Performed by: FAMILY MEDICINE

## 2024-03-19 NOTE — ASSESSMENT & PLAN NOTE
-continuing to improve  -KIRA-7 score is 2 today, indicating minimal anxiety   -current regimen: wellbutrin 300mg QD, seroquel 200mg qhs, atarax 25mg prn anxiety    Plan:  Continue Wellbutrin 300mg QD, Continue seroquel managed by his psychiatrist  Return for follow up in 6 months  Will complete [facility] paperwork stating that patient may be excused for up to 2 days a week if need for anxiety attacks, starting from 2/1 to 8/1, as paperwork is good for up to 6 months per patient   Continue follow up with therapy, psychiatry

## 2024-03-19 NOTE — PROGRESS NOTES
Assessment/Plan:    Anxiety  -continuing to improve  -KIRA-7 score is 2 today, indicating minimal anxiety   -current regimen: wellbutrin 300mg QD, seroquel 200mg qhs, atarax 25mg prn anxiety    Plan:  Continue Wellbutrin 300mg QD, Continue seroquel managed by his psychiatrist  Return for follow up in 6 months  Will complete FMLA paperwork stating that patient may be excused for up to 2 days a week if need for anxiety attacks, starting from 2/1 to 8/1, as paperwork is good for up to 6 months per patient   Continue follow up with therapy, psychiatry       Diagnoses and all orders for this visit:    Anxiety          Subjective:      Patient ID: Pj Velasquez is a 48 y.o. male.    Patoent presents for follow up visit for anxiety. Patient states he is doing much better. He completed the outpatient program and is following with a therapist weekly and taking his meds as prescribed, wellbutrin 300mg qAM and seroquel 200mg qhs.    Needs FMLA paperwork completed from Feb 1 stating he can have up to 2 days off a week if needed for anxiety and panic attacks. Needs to be reauthorized every 6 months.     Otherwise denies any acute complaints or concerns.        The following portions of the patient's history were reviewed and updated as appropriate: allergies, current medications, past family history, past medical history, past social history, past surgical history, and problem list.    Review of Systems   Constitutional:  Negative for chills and fever.   HENT:  Negative for ear pain and sore throat.    Eyes:  Negative for pain and visual disturbance.   Respiratory:  Negative for cough and shortness of breath.    Cardiovascular:  Negative for chest pain and palpitations.   Gastrointestinal:  Negative for abdominal pain, constipation and vomiting.   Genitourinary:  Negative for dysuria and hematuria.   Musculoskeletal:  Negative for arthralgias and back pain.   Skin:  Negative for color change and rash.   Neurological:  Negative for  seizures and syncope.   All other systems reviewed and are negative.        Objective:      /85 (BP Location: Left arm, Patient Position: Sitting, Cuff Size: Large)   Pulse 94   Temp 99.4 °F (37.4 °C)   Resp 18   Wt 76.2 kg (168 lb)   BMI 24.11 kg/m²          Physical Exam  Constitutional:       General: He is not in acute distress.     Appearance: He is not ill-appearing or toxic-appearing.   HENT:      Head: Normocephalic and atraumatic.      Right Ear: External ear normal.      Left Ear: External ear normal.      Nose: Nose normal.      Mouth/Throat:      Mouth: Mucous membranes are moist.      Pharynx: Oropharynx is clear.   Eyes:      General: No scleral icterus.        Right eye: No discharge.         Left eye: No discharge.      Conjunctiva/sclera: Conjunctivae normal.   Cardiovascular:      Rate and Rhythm: Normal rate and regular rhythm.      Pulses: Normal pulses.      Heart sounds: Normal heart sounds. No murmur heard.  Pulmonary:      Effort: Pulmonary effort is normal. No respiratory distress.      Breath sounds: Normal breath sounds. No wheezing, rhonchi or rales.   Abdominal:      Palpations: Abdomen is soft.   Musculoskeletal:         General: No swelling. Normal range of motion.      Right lower leg: No edema.      Left lower leg: No edema.   Skin:     General: Skin is warm and dry.      Coloration: Skin is not jaundiced.   Neurological:      General: No focal deficit present.      Mental Status: He is alert and oriented to person, place, and time. Mental status is at baseline.   Psychiatric:         Mood and Affect: Mood normal.         Behavior: Behavior normal.

## 2024-03-20 ENCOUNTER — TELEPHONE (OUTPATIENT)
Dept: FAMILY MEDICINE CLINIC | Facility: CLINIC | Age: 49
End: 2024-03-20

## 2024-03-20 NOTE — TELEPHONE ENCOUNTER
Folder (To be completed by) -  Dr. Gao     Name of Form - Updated FMLA Forms    Color folder ([place])    Form to be Faxed (Fax #), [phone]    Patient was made aware of the 7-10 business day form policy.

## 2024-06-11 DIAGNOSIS — F41.9 ANXIETY: ICD-10-CM

## 2024-06-11 RX ORDER — HYDROXYZINE HYDROCHLORIDE 10 MG/1
10 TABLET, FILM COATED ORAL
Qty: 30 TABLET | Refills: 5 | Status: SHIPPED | OUTPATIENT
Start: 2024-06-11

## 2024-07-03 ENCOUNTER — OFFICE VISIT (OUTPATIENT)
Dept: FAMILY MEDICINE CLINIC | Facility: CLINIC | Age: 49
End: 2024-07-03

## 2024-07-03 VITALS
DIASTOLIC BLOOD PRESSURE: 93 MMHG | OXYGEN SATURATION: 99 % | TEMPERATURE: 98.8 F | HEIGHT: 70 IN | BODY MASS INDEX: 24.08 KG/M2 | HEART RATE: 82 BPM | WEIGHT: 168.2 LBS | RESPIRATION RATE: 18 BRPM | SYSTOLIC BLOOD PRESSURE: 157 MMHG

## 2024-07-03 DIAGNOSIS — G43.809 OTHER MIGRAINE WITHOUT STATUS MIGRAINOSUS, NOT INTRACTABLE: Primary | ICD-10-CM

## 2024-07-03 DIAGNOSIS — G47.00 INSOMNIA, UNSPECIFIED TYPE: ICD-10-CM

## 2024-07-03 DIAGNOSIS — F41.0 PANIC ATTACKS: ICD-10-CM

## 2024-07-03 PROCEDURE — 99214 OFFICE O/P EST MOD 30 MIN: CPT | Performed by: FAMILY MEDICINE

## 2024-07-03 RX ORDER — HYDROXYZINE HYDROCHLORIDE 25 MG/1
25 TABLET, FILM COATED ORAL
COMMUNITY
Start: 2024-05-14 | End: 2024-07-03

## 2024-07-03 RX ORDER — RIZATRIPTAN BENZOATE 5 MG/1
5 TABLET, ORALLY DISINTEGRATING ORAL AS NEEDED
Qty: 9 TABLET | Refills: 3 | Status: SHIPPED | OUTPATIENT
Start: 2024-07-03 | End: 2024-07-03

## 2024-07-03 RX ORDER — RIZATRIPTAN BENZOATE 5 MG/1
5 TABLET, ORALLY DISINTEGRATING ORAL AS NEEDED
Qty: 8 TABLET | Refills: 1 | Status: SHIPPED | OUTPATIENT
Start: 2024-07-03

## 2024-07-03 RX ORDER — HYDROXYZINE HYDROCHLORIDE 25 MG/1
50 TABLET, FILM COATED ORAL
Qty: 20 TABLET | Refills: 0 | Status: SHIPPED | OUTPATIENT
Start: 2024-07-03

## 2024-07-03 NOTE — ASSESSMENT & PLAN NOTE
Patient has a history of migraines previously treated with Maxalt, which he ran out of.  Since this past Sunday he has had daily migraines that occur after his panic attacks.  He normally takes 1 Maxalt pill after every panic attack to prevent migraines.  His migraines are associated with sensitivity to light, sound and smells.  Patient has good relief with Maxalt so we will continue that.      PLAN  -Maxalt 5 mg filled  -Patient educated about migraines  -Follow-up scheduled for 4 weeks

## 2024-07-03 NOTE — LETTER
July 3, 2024     Patient: Pj Velasquez  YOB: 1975  Date of Visit: 7/3/2024      To Whom it May Concern:    Pj Velasquez is under my professional care. Pj was seen in my office on 7/3/2024. Pj may return to work on 7/8/2024 . Please excuse for July 2nd and 3rd.    If you have any questions or concerns, please don't hesitate to call.         Sincerely,          Eddi Donis DO        CC: No Recipients

## 2024-07-03 NOTE — ASSESSMENT & PLAN NOTE
Patient has a history of panic attacks and is currently on Wellbutrin 300 mg.  He follows with a psychiatrist and is currently in the process of weaning off Seroquel.      PLAN   -Continue Wellbutrin 300 mg  -Follow-up with psychiatrist for future medication adjustments

## 2024-07-03 NOTE — PROGRESS NOTES
Ambulatory Visit  Name: Pj Velasquez      : 1975      MRN: [id number]  Encounter Provider: Eddi Donis DO  Encounter Date: 7/3/2024   Encounter department: [facility]    Assessment & Plan   1. Other migraine without status migrainosus, not intractable  Assessment & Plan:  Patient has a history of migraines previously treated with Maxalt, which he ran out of.  Since this past  he has had daily migraines that occur after his panic attacks.  He normally takes 1 Maxalt pill after every panic attack to prevent migraines.  His migraines are associated with sensitivity to light, sound and smells.  Patient has good relief with Maxalt so we will continue that.      PLAN  -Maxalt 5 mg filled  -Patient educated about migraines  -Follow-up scheduled for 4 weeks  Orders:  -     rizatriptan (MAXALT-MLT) 5 mg disintegrating tablet; Take 1 tablet (5 mg total) by mouth as needed for migraine Take at the onset of migraine; if symptoms continue or return, may take another dose at least 2 hours after first dose. Take no more than 2 doses in a day.  2. Insomnia, unspecified type  Assessment & Plan:  Patient has insomnia daily which has been getting worse and has tried hydroxyzine 25 mg daily which is not working.        PLAN   -Hydroxyzine dosage increased to 50 mg daily at bedtime  -Patient educated on good sleep hygiene  Orders:  -     hydrOXYzine HCL (ATARAX) 25 mg tablet; Take 2 tablets (50 mg total) by mouth daily at bedtime  3. Panic attacks  Assessment & Plan:  Patient has a history of panic attacks and is currently on Wellbutrin 300 mg.  He follows with a psychiatrist and is currently in the process of weaning off Seroquel.      PLAN   -Continue Wellbutrin 300 mg  -Follow-up with psychiatrist for future medication adjustments       History of Present Illness     Pj is a 49-year-old male with a past medical history of migraines, panic attacks, anxiety, depression,  "insomnia and urinary retention presenting today for migraines and panic attacks. His migraines have persisted for the past 3 weeks and starting this Sunday, they became severe. They are located bilaterally to the temporal and occipital region of the head, are nonstop, are aggravated by light, noise, and smells, are relieved by Mexalt which he has taken before but is currently out of, is a 10 out of 10 at its worst and is a 6 out of 10 now in the office.  Of note, patient states that these migraines are began after having a panic attack.  These panic attacks have occurred more frequently.              Review of Systems   Constitutional:  Negative for chills and fever.   HENT:  Negative for ear pain and sore throat.    Eyes:  Positive for photophobia. Negative for pain and visual disturbance.   Respiratory:  Negative for cough and shortness of breath.    Cardiovascular:  Negative for chest pain and palpitations.   Gastrointestinal:  Positive for nausea. Negative for abdominal pain and vomiting.   Genitourinary:  Negative for dysuria and hematuria.   Musculoskeletal:  Negative for arthralgias and back pain.   Skin:  Negative for color change and rash.   Neurological:  Negative for seizures and syncope.   All other systems reviewed and are negative.      Objective     /93 (BP Location: Left arm, Patient Position: Sitting, Cuff Size: Standard)   Pulse 82   Temp 98.8 °F (37.1 °C) (Temporal)   Resp 18   Ht 5' 10\" (1.778 m)   Wt 76.3 kg (168 lb 3.2 oz)   SpO2 99%   BMI 24.13 kg/m²     Physical Exam  Vitals and nursing note reviewed.   Constitutional:       General: He is not in acute distress.     Appearance: He is well-developed.   HENT:      Head: Normocephalic and atraumatic.      Comments: Tender to palpation bilaterally temporal and occipital regions     Right Ear: Tympanic membrane, ear canal and external ear normal.      Left Ear: Tympanic membrane, ear canal and external ear normal.      Nose: Nose " normal.      Mouth/Throat:      Mouth: Mucous membranes are moist.      Pharynx: Oropharynx is clear.   Eyes:      Extraocular Movements: Extraocular movements intact.      Conjunctiva/sclera: Conjunctivae normal.      Pupils: Pupils are equal, round, and reactive to light.      Comments: Pain with superior vision   Cardiovascular:      Rate and Rhythm: Normal rate and regular rhythm.      Heart sounds: Normal heart sounds. No murmur heard.  Pulmonary:      Effort: Pulmonary effort is normal. No respiratory distress.      Breath sounds: Normal breath sounds.   Abdominal:      Palpations: Abdomen is soft.      Tenderness: There is no abdominal tenderness.   Musculoskeletal:         General: No swelling.      Cervical back: Neck supple.   Skin:     General: Skin is warm and dry.      Capillary Refill: Capillary refill takes less than 2 seconds.   Neurological:      General: No focal deficit present.      Mental Status: He is alert.      Gait: Gait normal.   Psychiatric:         Mood and Affect: Mood normal.         Behavior: Behavior normal.       Administrative Statements         Eddi Donis DO   PGY-1, Family Medicine  [facility]

## 2024-07-03 NOTE — ASSESSMENT & PLAN NOTE
Patient has insomnia daily which has been getting worse and has tried hydroxyzine 25 mg daily which is not working.        PLAN   -Hydroxyzine dosage increased to 50 mg daily at bedtime  -Patient educated on good sleep hygiene

## 2024-07-10 ENCOUNTER — RA CDI HCC (OUTPATIENT)
Dept: OTHER | Facility: HOSPITAL | Age: 49
End: 2024-07-10

## 2024-07-17 ENCOUNTER — OFFICE VISIT (OUTPATIENT)
Dept: FAMILY MEDICINE CLINIC | Facility: CLINIC | Age: 49
End: 2024-07-17

## 2024-07-17 VITALS
HEART RATE: 93 BPM | RESPIRATION RATE: 16 BRPM | SYSTOLIC BLOOD PRESSURE: 131 MMHG | WEIGHT: 162.6 LBS | TEMPERATURE: 98.3 F | BODY MASS INDEX: 24.64 KG/M2 | OXYGEN SATURATION: 98 % | DIASTOLIC BLOOD PRESSURE: 87 MMHG | HEIGHT: 68 IN

## 2024-07-17 DIAGNOSIS — Z23 ENCOUNTER FOR IMMUNIZATION: ICD-10-CM

## 2024-07-17 DIAGNOSIS — Z12.11 COLON CANCER SCREENING: ICD-10-CM

## 2024-07-17 DIAGNOSIS — Z00.00 ANNUAL PHYSICAL EXAM: Primary | ICD-10-CM

## 2024-07-17 DIAGNOSIS — F32.A DEPRESSION, UNSPECIFIED DEPRESSION TYPE: ICD-10-CM

## 2024-07-17 PROCEDURE — 99396 PREV VISIT EST AGE 40-64: CPT | Performed by: FAMILY MEDICINE

## 2024-07-17 PROCEDURE — 90715 TDAP VACCINE 7 YRS/> IM: CPT | Performed by: FAMILY MEDICINE

## 2024-07-17 PROCEDURE — 90471 IMMUNIZATION ADMIN: CPT | Performed by: FAMILY MEDICINE

## 2024-07-17 RX ORDER — BUPROPION HYDROCHLORIDE 300 MG/1
300 TABLET ORAL EVERY MORNING
Qty: 30 TABLET | Refills: 0 | Status: SHIPPED | OUTPATIENT
Start: 2024-07-17

## 2024-07-17 NOTE — PROGRESS NOTES
Adult Annual Physical  Name: Pj Velasquez      : 1975      MRN: [id number]  Encounter Provider: Eddi Donis DO  Encounter Date: 2024   Encounter department: [facility]    Assessment & Plan   1. Annual physical exam  Assessment & Plan:  Pt states he is doing well at baseline state of health and without any acute concerns or questions at this visit.  - Screening for cardiovascular disease: Up-to-date, BMP and Lipid panel completed 6 months ago.  - ASCVD score: 8.3%.  - Colonoscopy: Referral given.  - HIV and Hep C screenings: Both negative.  - Vaccinations: Flu: Up-to-date, COVID: Up-to-date, TDAP: Given in office today.  - Depression screening: PHQ score 12 (24)  - Counseled the patient on healthy lifestyle habits including diet and exercise.     2. Encounter for immunization  -     TDAP VACCINE GREATER THAN OR EQUAL TO 6YO IM  3. Colon cancer screening  -     Ambulatory referral to Gastroenterology; Future  4. Depression, unspecified depression type  Assessment & Plan:  Bupropion 300 mg refilled until patient has psychiatry appointment  Orders:  -     buPROPion (WELLBUTRIN XL) 300 mg 24 hr tablet; Take 1 tablet (300 mg total) by mouth every morning    Immunizations and preventive care screenings were discussed with patient today. Appropriate education was printed on patient's after visit summary.        Counseling:  Exercise: the importance of regular exercise/physical activity was discussed. Recommend exercise 3-5 times per week for at least 30 minutes.       Depression Screening and Follow-up Plan: Patient's depression screening was positive with a PHQ-9 score of 12. Continue regular follow-up with their mental health provider who is managing their mental health condition(s). Patient with underlying depression and was advised to continue current medications as prescribed.         History of Present Illness       Patient is a 49-year-old male with a past  medical history of anxiety, and depression, and panic attacks presenting today for a annual physical.  He has no complaints at this time.    Adult Annual Physical:  Patient presents for annual physical.     Diet and Physical Activity:  - Diet/Nutrition: poor diet, intermittent fasting and prolonged fasting.  - Exercise: walking and 30-60 minutes on average.    Depression Screening:    - PHQ-9 Score: 12    General Health:  - Sleep: sleeps poorly and 1-3 hours of sleep on average.  - Hearing: normal hearing bilateral ears.  - Vision: no vision problems. wears reading glasses  - Dental: regular dental visits and brushes teeth twice daily.     Health:  - History of STDs: no.   - Urinary symptoms: none.     Review of Systems   Constitutional:  Negative for chills and fever.   HENT:  Negative for ear pain and sore throat.    Eyes:  Negative for pain and visual disturbance.   Respiratory:  Negative for cough and shortness of breath.    Cardiovascular:  Negative for chest pain and palpitations.   Gastrointestinal:  Negative for abdominal pain and vomiting.   Genitourinary:  Negative for dysuria and hematuria.   Musculoskeletal:  Negative for arthralgias and back pain.   Skin:  Negative for color change and rash.   Neurological:  Negative for seizures and syncope.   All other systems reviewed and are negative.    Current Outpatient Medications on File Prior to Visit   Medication Sig Dispense Refill    acetaminophen (TYLENOL) 500 mg tablet Take by mouth      clotrimazole (LOTRIMIN) 1 % cream Apply topically 2 (two) times a day 30 g 0    hydrOXYzine HCL (ATARAX) 25 mg tablet Take 2 tablets (50 mg total) by mouth daily at bedtime 20 tablet 0    multivitamin (THERAGRAN) TABS Take 1 tablet by mouth daily      rizatriptan (MAXALT-MLT) 5 mg disintegrating tablet Take 1 tablet (5 mg total) by mouth as needed for migraine Take at the onset of migraine; if symptoms continue or return, may take another dose at least 2 hours after  "first dose. Take no more than 2 doses in a day. 8 tablet 1    [DISCONTINUED] buPROPion (WELLBUTRIN XL) 300 mg 24 hr tablet       buPROPion (WELLBUTRIN XL) 150 mg 24 hr tablet Take 1 tablet (150 mg total) by mouth every morning (Patient not taking: Reported on 12/21/2023) 90 tablet 1    Cranberry 400 MG CAPS Take 1 tablet by mouth daily (Patient not taking: Reported on 7/3/2024)      naproxen (Naprosyn) 500 mg tablet Take 1 tablet (500 mg total) by mouth 2 (two) times a day as needed for mild pain or headaches (Patient not taking: Reported on 7/3/2024) 30 tablet 1    QUEtiapine (SEROquel) 100 mg tablet Take 200 mg by mouth daily at bedtime (Patient not taking: Reported on 7/17/2024)      QUEtiapine (SEROquel) 50 mg tablet Take 100 mg by mouth daily at bedtime (Patient not taking: Reported on 12/21/2023)      tamsulosin (FLOMAX) 0.4 mg Take 2 capsules (0.8 mg total) by mouth daily with dinner (Patient not taking: Reported on 7/3/2024) 180 capsule 2     No current facility-administered medications on file prior to visit.        Objective     /87   Pulse 93   Temp 98.3 °F (36.8 °C)   Resp 16   Ht 5' 7.9\" (1.725 m)   Wt 73.8 kg (162 lb 9.6 oz)   SpO2 98%   BMI 24.80 kg/m²     Physical Exam  Vitals and nursing note reviewed.   Constitutional:       General: He is not in acute distress.     Appearance: Normal appearance. He is well-developed and normal weight.   HENT:      Head: Normocephalic and atraumatic.      Right Ear: Tympanic membrane, ear canal and external ear normal.      Left Ear: Tympanic membrane, ear canal and external ear normal.      Nose: Nose normal.   Eyes:      Conjunctiva/sclera: Conjunctivae normal.   Cardiovascular:      Rate and Rhythm: Normal rate and regular rhythm.      Pulses: Normal pulses.      Heart sounds: Normal heart sounds. No murmur heard.  Pulmonary:      Effort: Pulmonary effort is normal. No respiratory distress.      Breath sounds: Normal breath sounds.   Abdominal:      " General: Bowel sounds are normal. There is no distension.      Palpations: Abdomen is soft.      Tenderness: There is no abdominal tenderness. There is no guarding or rebound.   Musculoskeletal:         General: No swelling.      Cervical back: Neck supple.   Skin:     General: Skin is warm and dry.      Capillary Refill: Capillary refill takes less than 2 seconds.   Neurological:      General: No focal deficit present.      Mental Status: He is alert and oriented to person, place, and time.   Psychiatric:         Mood and Affect: Mood normal.         Behavior: Behavior normal.         Thought Content: Thought content normal.         Judgment: Judgment normal.           Eddi Donis DO   PGY-1, Family Medicine  [facility]

## 2024-07-17 NOTE — PROGRESS NOTES
"Adult Annual Physical  Name: Pj Velasquez      : 1975      MRN: [id number]  Encounter Provider: Eddi Donis DO  Encounter Date: 2024   Encounter department: [facility]    Assessment & Plan   1. Annual physical exam  2. Encounter for immunization  3. Colon cancer screening  -     Ambulatory referral to Gastroenterology; Future    Immunizations and preventive care screenings were discussed with patient today. Appropriate education was printed on patient's after visit summary.    {Prostate cancer screening - consider in males between age of 40-75 depending on age, race, and risk factors. There is difference in the guidelines in regards to the optimal age for screening.  USPSTF states to consider periodic screening in males between the age of 50 to 69. This text is informational and does not need to be selected but if you wish, you can insert standard documentation in your progress note by using F2 (Optional):69155}    Counseling:  {Annual Physical; Counselin}         History of Present Illness   {Disappearing Hyperlinks I Encounters * My Last Note * Since Last Visit * History :29337}  Adult Annual Physical  Review of Systems  {Select to Display [facility] (Optional):64069}    Objective   {Disappearing Hyperlinks   Review Vitals * Enter New Vitals * Results Review * Labs * Imaging * Cardiology * Procedures * Lung Cancer Screening :76421}  /87   Pulse 93   Temp 98.3 °F (36.8 °C)   Resp 16   Ht 5' 7.9\" (1.725 m)   Wt 73.8 kg (162 lb 9.6 oz)   SpO2 98%   BMI 24.80 kg/m²     Physical Exam  {Administrative / Billing Section (Optional):65834}  "

## 2024-07-17 NOTE — PROGRESS NOTES
Adult Annual Physical  Name: Pj Velasquez      : 1975      MRN: [id number]  Encounter Provider: Eddi Donis DO  Encounter Date: 2024   Encounter department: [facility]    Assessment & Plan   1. Annual physical exam  2. Encounter for immunization    Immunizations and preventive care screenings were discussed with patient today. Appropriate education was printed on patient's after visit summary.    {Prostate cancer screening - consider in males between age of 40-75 depending on age, race, and risk factors. There is difference in the guidelines in regards to the optimal age for screening.  USPSTF states to consider periodic screening in males between the age of 50 to 69. This text is informational and does not need to be selected but if you wish, you can insert standard documentation in your progress note by using F2 (Optional):59176}    Counseling:  {Annual Physical; Counselin}         History of Present Illness   {Disappearing Hyperlinks I Encounters * My Last Note * Since Last Visit * History :71235}  Adult Annual Physical:  Patient presents for annual physical.     Diet and Physical Activity:  - Diet/Nutrition: poor diet, intermittent fasting and prolonged fasting.  - Exercise: walking and 30-60 minutes on average.    Depression Screening:    - PHQ-9 Score: 12    General Health:  - Sleep: sleeps poorly and 1-3 hours of sleep on average.  - Hearing: normal hearing bilateral ears.  - Vision: no vision problems. wears reading glasses  - Dental: regular dental visits and brushes teeth twice daily.     Health:  - History of STDs: no.   - Urinary symptoms: none.     Review of Systems   Constitutional:  Negative for chills and fever.   HENT:  Negative for ear pain and sore throat.    Eyes:  Negative for pain and visual disturbance.   Respiratory:  Negative for cough and shortness of breath.    Cardiovascular:  Negative for chest pain and palpitations.  "  Gastrointestinal:  Negative for abdominal pain and vomiting.   Genitourinary:  Negative for dysuria and hematuria.   Musculoskeletal:  Negative for arthralgias and back pain.   Skin:  Negative for color change and rash.   Neurological:  Negative for seizures and syncope.   All other systems reviewed and are negative.    {Select to Display [facility] (Optional):87042}    Objective   {Disappearing Hyperlinks   Review Vitals * Enter New Vitals * Results Review * Labs * Imaging * Cardiology * Procedures * Lung Cancer Screening :27342}  /87   Pulse 93   Temp 98.3 °F (36.8 °C)   Resp 16   Ht 5' 7.9\" (1.725 m)   Wt 73.8 kg (162 lb 9.6 oz)   SpO2 98%   BMI 24.80 kg/m²     Physical Exam  Vitals and nursing note reviewed.   Constitutional:       General: He is not in acute distress.     Appearance: Normal appearance. He is well-developed and normal weight.   HENT:      Head: Normocephalic and atraumatic.      Right Ear: Tympanic membrane, ear canal and external ear normal.      Left Ear: Tympanic membrane, ear canal and external ear normal.      Nose: Nose normal.   Eyes:      Conjunctiva/sclera: Conjunctivae normal.   Cardiovascular:      Rate and Rhythm: Normal rate and regular rhythm.      Heart sounds: Normal heart sounds. No murmur heard.  Pulmonary:      Effort: Pulmonary effort is normal. No respiratory distress.      Breath sounds: Normal breath sounds.   Abdominal:      General: Bowel sounds are normal. There is no distension.      Palpations: Abdomen is soft.      Tenderness: There is no abdominal tenderness. There is no guarding or rebound.   Musculoskeletal:         General: No swelling.      Cervical back: Neck supple.   Skin:     General: Skin is warm and dry.      Capillary Refill: Capillary refill takes less than 2 seconds.   Neurological:      General: No focal deficit present.      Mental Status: He is alert and oriented to person, place, and time.   Psychiatric:         Mood and Affect: Mood " normal.         Behavior: Behavior normal.         Thought Content: Thought content normal.         Judgment: Judgment normal.     {Administrative / Billing Section (Optional):99974}

## 2024-07-17 NOTE — PATIENT INSTRUCTIONS
"Patient Education     Routine physical for adults   The Basics   Written by the doctors and editors at [facility]   What is a physical? -- A physical is a routine visit, or \"check-up,\" with your doctor. You might also hear it called a \"wellness visit\" or \"preventive visit.\"  During each visit, the doctor will:   Ask about your physical and mental health   Ask about your habits, behaviors, and lifestyle   Do an exam   Give you vaccines if needed   Talk to you about any medicines you take   Give advice about your health   Answer your questions  Getting regular check-ups is an important part of taking care of your health. It can help your doctor find and treat any problems you have. But it's also important for preventing health problems.  A routine physical is different from a \"sick visit.\" A sick visit is when you see a doctor because of a health concern or problem. Since physicals are scheduled ahead of time, you can think about what you want to ask the doctor.  How often should I get a physical? -- It depends on your age and health. In general, for people age 21 years and older:   If you are younger than 50 years, you might be able to get a physical every 3 years.   If you are 50 years or older, your doctor might recommend a physical every year.  If you have an ongoing health condition, like diabetes or high blood pressure, your doctor will probably want to see you more often.  What happens during a physical? -- In general, each visit will include:   Physical exam - The doctor or nurse will check your height, weight, heart rate, and blood pressure. They will also look at your eyes and ears. They will ask about how you are feeling and whether you have any symptoms that bother you.   Medicines - It's a good idea to bring a list of all the medicines you take to each doctor visit. Your doctor will talk to you about your medicines and answer any questions. Tell them if you are having any side effects that bother you. You " "should also tell them if you are having trouble paying for any of your medicines.   Habits and behaviors - This includes:   Your diet   Your exercise habits   Whether you smoke, drink alcohol, or use drugs   Whether you are sexually active   Whether you feel safe at home  Your doctor will talk to you about things you can do to improve your health and lower your risk of health problems. They will also offer help and support. For example, if you want to quit smoking, they can give you advice and might prescribe medicines. If you want to improve your diet or get more physical activity, they can help you with this, too.   Lab tests, if needed - The tests you get will depend on your age and situation. For example, your doctor might want to check your:   Cholesterol   Blood sugar   Iron level   Vaccines - The recommended vaccines will depend on your age, health, and what vaccines you already had. Vaccines are very important because they can prevent certain serious or deadly infections.   Discussion of screening - \"Screening\" means checking for diseases or other health problems before they cause symptoms. Your doctor can recommend screening based on your age, risk, and preferences. This might include tests to check for:   Cancer, such as breast, prostate, cervical, ovarian, colorectal, prostate, lung, or skin cancer   Sexually transmitted infections, such as chlamydia and gonorrhea   Mental health conditions like depression and anxiety  Your doctor will talk to you about the different types of screening tests. They can help you decide which screenings to have. They can also explain what the results might mean.   Answering questions - The physical is a good time to ask the doctor or nurse questions about your health. If needed, they can refer you to other doctors or specialists, too.  Adults older than 65 years often need other care, too. As you get older, your doctor will talk to you about:   How to prevent falling at " home   Hearing or vision tests   Memory testing   How to take your medicines safely   Making sure that you have the help and support you need at home  All topics are updated as new evidence becomes available and our peer review process is complete.  This topic retrieved from [employer] on: May 02, 2024.  Topic 355456 Version 1.0  Release: 32.4.3 - C32.122  © 2024 UpToDate, Inc. and/or its affiliates. All rights reserved.  Consumer Information Use and Disclaimer   Disclaimer: This generalized information is a limited summary of diagnosis, treatment, and/or medication information. It is not meant to be comprehensive and should be used as a tool to help the user understand and/or assess potential diagnostic and treatment options. It does NOT include all information about conditions, treatments, medications, side effects, or risks that may apply to a specific patient. It is not intended to be medical advice or a substitute for the medical advice, diagnosis, or treatment of a health care provider based on the health care provider's examination and assessment of a patient's specific and unique circumstances. Patients must speak with a health care provider for complete information about their health, medical questions, and treatment options, including any risks or benefits regarding use of medications. This information does not endorse any treatments or medications as safe, effective, or approved for treating a specific patient. UpToDate, Inc. and its affiliates disclaim any warranty or liability relating to this information or the use thereof.The use of this information is governed by the Terms of Use, available at https://www.woltersWowsaiuwer.com/en/know/clinical-effectiveness-terms. 2024© UpToDate, Inc. and its affiliates and/or licensors. All rights reserved.  Copyright   © 2024 UpToDate, Inc. and/or its affiliates. All rights reserved.    Patient Education     Routine physical for adults   The Basics   Written by the  "doctors and editors at [facility]   What is a physical? -- A physical is a routine visit, or \"check-up,\" with your doctor. You might also hear it called a \"wellness visit\" or \"preventive visit.\"  During each visit, the doctor will:   Ask about your physical and mental health   Ask about your habits, behaviors, and lifestyle   Do an exam   Give you vaccines if needed   Talk to you about any medicines you take   Give advice about your health   Answer your questions  Getting regular check-ups is an important part of taking care of your health. It can help your doctor find and treat any problems you have. But it's also important for preventing health problems.  A routine physical is different from a \"sick visit.\" A sick visit is when you see a doctor because of a health concern or problem. Since physicals are scheduled ahead of time, you can think about what you want to ask the doctor.  How often should I get a physical? -- It depends on your age and health. In general, for people age 21 years and older:   If you are younger than 50 years, you might be able to get a physical every 3 years.   If you are 50 years or older, your doctor might recommend a physical every year.  If you have an ongoing health condition, like diabetes or high blood pressure, your doctor will probably want to see you more often.  What happens during a physical? -- In general, each visit will include:   Physical exam - The doctor or nurse will check your height, weight, heart rate, and blood pressure. They will also look at your eyes and ears. They will ask about how you are feeling and whether you have any symptoms that bother you.   Medicines - It's a good idea to bring a list of all the medicines you take to each doctor visit. Your doctor will talk to you about your medicines and answer any questions. Tell them if you are having any side effects that bother you. You should also tell them if you are having trouble paying for any of your " "medicines.   Habits and behaviors - This includes:   Your diet   Your exercise habits   Whether you smoke, drink alcohol, or use drugs   Whether you are sexually active   Whether you feel safe at home  Your doctor will talk to you about things you can do to improve your health and lower your risk of health problems. They will also offer help and support. For example, if you want to quit smoking, they can give you advice and might prescribe medicines. If you want to improve your diet or get more physical activity, they can help you with this, too.   Lab tests, if needed - The tests you get will depend on your age and situation. For example, your doctor might want to check your:   Cholesterol   Blood sugar   Iron level   Vaccines - The recommended vaccines will depend on your age, health, and what vaccines you already had. Vaccines are very important because they can prevent certain serious or deadly infections.   Discussion of screening - \"Screening\" means checking for diseases or other health problems before they cause symptoms. Your doctor can recommend screening based on your age, risk, and preferences. This might include tests to check for:   Cancer, such as breast, prostate, cervical, ovarian, colorectal, prostate, lung, or skin cancer   Sexually transmitted infections, such as chlamydia and gonorrhea   Mental health conditions like depression and anxiety  Your doctor will talk to you about the different types of screening tests. They can help you decide which screenings to have. They can also explain what the results might mean.   Answering questions - The physical is a good time to ask the doctor or nurse questions about your health. If needed, they can refer you to other doctors or specialists, too.  Adults older than 65 years often need other care, too. As you get older, your doctor will talk to you about:   How to prevent falling at home   Hearing or vision tests   Memory testing   How to take your " medicines safely   Making sure that you have the help and support you need at home  All topics are updated as new evidence becomes available and our peer review process is complete.  This topic retrieved from [employer] on: May 02, 2024.  Topic 244144 Version 1.0  Release: 32.4.3 - C32.122  © 2024 UpToDate, Inc. and/or its affiliates. All rights reserved.  Consumer Information Use and Disclaimer   Disclaimer: This generalized information is a limited summary of diagnosis, treatment, and/or medication information. It is not meant to be comprehensive and should be used as a tool to help the user understand and/or assess potential diagnostic and treatment options. It does NOT include all information about conditions, treatments, medications, side effects, or risks that may apply to a specific patient. It is not intended to be medical advice or a substitute for the medical advice, diagnosis, or treatment of a health care provider based on the health care provider's examination and assessment of a patient's specific and unique circumstances. Patients must speak with a health care provider for complete information about their health, medical questions, and treatment options, including any risks or benefits regarding use of medications. This information does not endorse any treatments or medications as safe, effective, or approved for treating a specific patient. UpToDate, Inc. and its affiliates disclaim any warranty or liability relating to this information or the use thereof.The use of this information is governed by the Terms of Use, available at https://www.woltersFormisimouwer.com/en/know/clinical-effectiveness-terms. 2024© UpToDate, Inc. and its affiliates and/or licensors. All rights reserved.  Copyright   © 2024 UpToDate, Inc. and/or its affiliates. All rights reserved.    Patient Education     Routine physical for adults   The Basics   Written by the doctors and editors at [employer]   What is a physical? -- A physical is a  "routine visit, or \"check-up,\" with your doctor. You might also hear it called a \"wellness visit\" or \"preventive visit.\"  During each visit, the doctor will:   Ask about your physical and mental health   Ask about your habits, behaviors, and lifestyle   Do an exam   Give you vaccines if needed   Talk to you about any medicines you take   Give advice about your health   Answer your questions  Getting regular check-ups is an important part of taking care of your health. It can help your doctor find and treat any problems you have. But it's also important for preventing health problems.  A routine physical is different from a \"sick visit.\" A sick visit is when you see a doctor because of a health concern or problem. Since physicals are scheduled ahead of time, you can think about what you want to ask the doctor.  How often should I get a physical? -- It depends on your age and health. In general, for people age 21 years and older:   If you are younger than 50 years, you might be able to get a physical every 3 years.   If you are 50 years or older, your doctor might recommend a physical every year.  If you have an ongoing health condition, like diabetes or high blood pressure, your doctor will probably want to see you more often.  What happens during a physical? -- In general, each visit will include:   Physical exam - The doctor or nurse will check your height, weight, heart rate, and blood pressure. They will also look at your eyes and ears. They will ask about how you are feeling and whether you have any symptoms that bother you.   Medicines - It's a good idea to bring a list of all the medicines you take to each doctor visit. Your doctor will talk to you about your medicines and answer any questions. Tell them if you are having any side effects that bother you. You should also tell them if you are having trouble paying for any of your medicines.   Habits and behaviors - This includes:   Your diet   Your exercise " "habits   Whether you smoke, drink alcohol, or use drugs   Whether you are sexually active   Whether you feel safe at home  Your doctor will talk to you about things you can do to improve your health and lower your risk of health problems. They will also offer help and support. For example, if you want to quit smoking, they can give you advice and might prescribe medicines. If you want to improve your diet or get more physical activity, they can help you with this, too.   Lab tests, if needed - The tests you get will depend on your age and situation. For example, your doctor might want to check your:   Cholesterol   Blood sugar   Iron level   Vaccines - The recommended vaccines will depend on your age, health, and what vaccines you already had. Vaccines are very important because they can prevent certain serious or deadly infections.   Discussion of screening - \"Screening\" means checking for diseases or other health problems before they cause symptoms. Your doctor can recommend screening based on your age, risk, and preferences. This might include tests to check for:   Cancer, such as breast, prostate, cervical, ovarian, colorectal, prostate, lung, or skin cancer   Sexually transmitted infections, such as chlamydia and gonorrhea   Mental health conditions like depression and anxiety  Your doctor will talk to you about the different types of screening tests. They can help you decide which screenings to have. They can also explain what the results might mean.   Answering questions - The physical is a good time to ask the doctor or nurse questions about your health. If needed, they can refer you to other doctors or specialists, too.  Adults older than 65 years often need other care, too. As you get older, your doctor will talk to you about:   How to prevent falling at home   Hearing or vision tests   Memory testing   How to take your medicines safely   Making sure that you have the help and support you need at home  All " topics are updated as new evidence becomes available and our peer review process is complete.  This topic retrieved from [employer] on: May 02, 2024.  Topic 273403 Version 1.0  Release: 32.4.3 - C32.122  © 2024 UpToDate, Inc. and/or its affiliates. All rights reserved.  Consumer Information Use and Disclaimer   Disclaimer: This generalized information is a limited summary of diagnosis, treatment, and/or medication information. It is not meant to be comprehensive and should be used as a tool to help the user understand and/or assess potential diagnostic and treatment options. It does NOT include all information about conditions, treatments, medications, side effects, or risks that may apply to a specific patient. It is not intended to be medical advice or a substitute for the medical advice, diagnosis, or treatment of a health care provider based on the health care provider's examination and assessment of a patient's specific and unique circumstances. Patients must speak with a health care provider for complete information about their health, medical questions, and treatment options, including any risks or benefits regarding use of medications. This information does not endorse any treatments or medications as safe, effective, or approved for treating a specific patient. UpToDate, Inc. and its affiliates disclaim any warranty or liability relating to this information or the use thereof.The use of this information is governed by the Terms of Use, available at https://www.woltersCosyforyouuwer.com/en/know/clinical-effectiveness-terms. 2024© UpToDate, Inc. and its affiliates and/or licensors. All rights reserved.  Copyright   © 2024 UpToDate, Inc. and/or its affiliates. All rights reserved.

## 2024-07-17 NOTE — ASSESSMENT & PLAN NOTE
Pt states he is doing well at baseline state of health and without any acute concerns or questions at this visit.  - Screening for cardiovascular disease: Up-to-date, BMP and Lipid panel completed 6 months ago.  - ASCVD score: 8.3%.  - Colonoscopy: Referral given.  - HIV and Hep C screenings: Both negative.  - Vaccinations: Flu: Up-to-date, COVID: Up-to-date, TDAP: Given in office today.  - Depression screening: PHQ score 12 (07/17/24)  - Counseled the patient on healthy lifestyle habits including diet and exercise.

## 2024-07-26 ENCOUNTER — TELEPHONE (OUTPATIENT)
Age: 49
End: 2024-07-26

## 2024-07-26 NOTE — TELEPHONE ENCOUNTER
Contacted patient off of NON-REFERRAL to verify needs of services in attempts to offer patient an appointment. LVM for patient to contact intake dept  in regards to wait list

## 2024-08-01 ENCOUNTER — TELEPHONE (OUTPATIENT)
Age: 49
End: 2024-08-01

## 2024-08-01 NOTE — TELEPHONE ENCOUNTER
08/01/24  Screened by: Kim Camacho    Referring Provider Dr. Donis    Pre- Screening: Yes    There is no height or weight on file to calculate BMI.  Has patient been referred for a routine screening Colonoscopy? yes  Is the patient between 45-75 years old? yes      Previous Colonoscopy no   If yes:    Date:     Facility:     Reason:     Does the patient want to see a Gastroenterologist prior to their procedure OR are they having any GI symptoms? no    Has the patient been hospitalized or had abdominal surgery in the past 6 months? no    Does the patient use supplemental oxygen? no    Does the patient take Coumadin, Lovenox, Plavix, Elliquis, Xarelto, or other blood thinning medication? no    Has the patient had a stroke, cardiac event, or stent placed in the past year? no

## 2024-08-01 NOTE — TELEPHONE ENCOUNTER
Scheduled date of EGD/colonoscopy (as of today): 08/15/2024  Physician performing EGD/colonoscopy: Dr. Brown  Location of EGD/colonoscopy: [facility]  Desired bowel prep reviewed with patient: CAROLYNE/DUL  Prep instructions sent via [employer]  Instructions reviewed with patient by: DENNY  Clearances: N/A

## 2024-08-12 ENCOUNTER — TELEPHONE (OUTPATIENT)
Dept: FAMILY MEDICINE CLINIC | Facility: CLINIC | Age: 49
End: 2024-08-12

## 2024-08-12 NOTE — TELEPHONE ENCOUNTER
Patient reports currently experiencing sleep problems. He had taken Seroquel in the past but does not anymore.    Patient has an appointment with psychiatry on 9/17, but would like to know if PCP can prescribe something to help with sleep in the interim. PCP is not available for appointment until 8/21.

## 2024-08-14 ENCOUNTER — TELEPHONE (OUTPATIENT)
Dept: GASTROENTEROLOGY | Facility: HOSPITAL | Age: 49
End: 2024-08-14

## 2024-08-14 NOTE — TELEPHONE ENCOUNTER
Pt calling for time, advised 0815AM arrival time. Reminded pt of need for . Pt confirmed understanding. No questions about prep.

## 2024-08-15 ENCOUNTER — HOSPITAL ENCOUNTER (OUTPATIENT)
Dept: GASTROENTEROLOGY | Facility: HOSPITAL | Age: 49
Setting detail: OUTPATIENT SURGERY
End: 2024-08-15
Attending: SURGERY
Payer: COMMERCIAL

## 2024-08-15 ENCOUNTER — ANESTHESIA EVENT (OUTPATIENT)
Dept: GASTROENTEROLOGY | Facility: HOSPITAL | Age: 49
End: 2024-08-15

## 2024-08-15 ENCOUNTER — ANESTHESIA (OUTPATIENT)
Dept: GASTROENTEROLOGY | Facility: HOSPITAL | Age: 49
End: 2024-08-15

## 2024-08-15 VITALS
HEART RATE: 73 BPM | HEIGHT: 71 IN | RESPIRATION RATE: 18 BRPM | SYSTOLIC BLOOD PRESSURE: 155 MMHG | OXYGEN SATURATION: 100 % | BODY MASS INDEX: 21.84 KG/M2 | WEIGHT: 156 LBS | TEMPERATURE: 98.5 F | DIASTOLIC BLOOD PRESSURE: 70 MMHG

## 2024-08-15 DIAGNOSIS — Z12.11 SCREENING FOR COLON CANCER: ICD-10-CM

## 2024-08-15 PROCEDURE — 45380 COLONOSCOPY AND BIOPSY: CPT | Performed by: SURGERY

## 2024-08-15 PROCEDURE — 88305 TISSUE EXAM BY PATHOLOGIST: CPT | Performed by: STUDENT IN AN ORGANIZED HEALTH CARE EDUCATION/TRAINING PROGRAM

## 2024-08-15 RX ORDER — DIPHENHYDRAMINE HCL 25 MG
25 CAPSULE ORAL EVERY 6 HOURS PRN
COMMUNITY

## 2024-08-15 RX ORDER — SODIUM CHLORIDE, SODIUM LACTATE, POTASSIUM CHLORIDE, CALCIUM CHLORIDE 600; 310; 30; 20 MG/100ML; MG/100ML; MG/100ML; MG/100ML
INJECTION, SOLUTION INTRAVENOUS CONTINUOUS PRN
Status: DISCONTINUED | OUTPATIENT
Start: 2024-08-15 | End: 2024-08-15

## 2024-08-15 RX ORDER — IBUPROFEN 200 MG
200 TABLET ORAL EVERY 6 HOURS PRN
COMMUNITY

## 2024-08-15 RX ORDER — PROPOFOL 10 MG/ML
INJECTION, EMULSION INTRAVENOUS AS NEEDED
Status: DISCONTINUED | OUTPATIENT
Start: 2024-08-15 | End: 2024-08-15

## 2024-08-15 RX ADMIN — PROPOFOL 30 MG: 10 INJECTION, EMULSION INTRAVENOUS at 09:15

## 2024-08-15 RX ADMIN — PROPOFOL 80 MG: 10 INJECTION, EMULSION INTRAVENOUS at 09:12

## 2024-08-15 RX ADMIN — PROPOFOL 30 MG: 10 INJECTION, EMULSION INTRAVENOUS at 09:23

## 2024-08-15 RX ADMIN — PROPOFOL 30 MG: 10 INJECTION, EMULSION INTRAVENOUS at 09:14

## 2024-08-15 RX ADMIN — Medication 40 MG: at 09:18

## 2024-08-15 RX ADMIN — PROPOFOL 20 MG: 10 INJECTION, EMULSION INTRAVENOUS at 09:13

## 2024-08-15 RX ADMIN — PROPOFOL 20 MG: 10 INJECTION, EMULSION INTRAVENOUS at 09:18

## 2024-08-15 RX ADMIN — SODIUM CHLORIDE, SODIUM LACTATE, POTASSIUM CHLORIDE, AND CALCIUM CHLORIDE: .6; .31; .03; .02 INJECTION, SOLUTION INTRAVENOUS at 09:07

## 2024-08-15 RX ADMIN — PROPOFOL 20 MG: 10 INJECTION, EMULSION INTRAVENOUS at 09:21

## 2024-08-15 NOTE — ANESTHESIA POSTPROCEDURE EVALUATION
Post-Op Assessment Note    CV Status:  Stable  Pain Score: 0    Pain management: adequate       Mental Status:  Alert and awake   Hydration Status:  Euvolemic and stable   PONV Controlled:  Controlled   Airway Patency:  Patent and adequate     Post Op Vitals Reviewed: Yes    No anethesia notable event occurred.    Staff: CRNA               BP  120/73    Temp 97.6 °F (36.4 °C) (08/15/24 0932)    Pulse  87   Resp 16   SpO2 98% RA

## 2024-08-15 NOTE — H&P
History and Physical   Colon and Rectal Surgery   Pj eVlasquez 49 y.o. male MRN: [id number]  Unit/Bed#:  Encounter: [id number]  08/15/24   @[facility]    No chief complaint on file.        History of Present Illness   HPI:  Pj Velasquez is a 49 y.o. male who presents for colonoscopy.      Historical Information   Past Medical History:   Diagnosis Date    Allergic     Anxiety     Migraine     Panic attack      Past Surgical History:   Procedure Laterality Date    CYST REMOVAL         Meds/Allergies     Not in a hospital admission.      Current Outpatient Medications:     acetaminophen (TYLENOL) 500 mg tablet, Take by mouth, Disp: , Rfl:     buPROPion (WELLBUTRIN XL) 150 mg 24 hr tablet, Take 1 tablet (150 mg total) by mouth every morning (Patient not taking: Reported on 12/21/2023), Disp: 90 tablet, Rfl: 1    buPROPion (WELLBUTRIN XL) 300 mg 24 hr tablet, Take 1 tablet (300 mg total) by mouth every morning, Disp: 30 tablet, Rfl: 0    clotrimazole (LOTRIMIN) 1 % cream, Apply topically 2 (two) times a day, Disp: 30 g, Rfl: 0    Cranberry 400 MG CAPS, Take 1 tablet by mouth daily (Patient not taking: Reported on 7/3/2024), Disp: , Rfl:     hydrOXYzine HCL (ATARAX) 25 mg tablet, Take 2 tablets (50 mg total) by mouth daily at bedtime, Disp: 20 tablet, Rfl: 0    multivitamin (THERAGRAN) TABS, Take 1 tablet by mouth daily, Disp: , Rfl:     naproxen (Naprosyn) 500 mg tablet, Take 1 tablet (500 mg total) by mouth 2 (two) times a day as needed for mild pain or headaches (Patient not taking: Reported on 7/3/2024), Disp: 30 tablet, Rfl: 1    QUEtiapine (SEROquel) 100 mg tablet, Take 200 mg by mouth daily at bedtime (Patient not taking: Reported on 7/17/2024), Disp: , Rfl:     QUEtiapine (SEROquel) 50 mg tablet, Take 100 mg by mouth daily at bedtime (Patient not taking: Reported on 12/21/2023), Disp: , Rfl:     rizatriptan (MAXALT-MLT) 5 mg disintegrating tablet, Take 1 tablet (5 mg total) by mouth as needed for migraine Take at the  onset of migraine; if symptoms continue or return, may take another dose at least 2 hours after first dose. Take no more than 2 doses in a day., Disp: 8 tablet, Rfl: 1    tamsulosin (FLOMAX) 0.4 mg, Take 2 capsules (0.8 mg total) by mouth daily with dinner (Patient not taking: Reported on 7/3/2024), Disp: 180 capsule, Rfl: 2    Allergies   Allergen Reactions    Banana - Food Allergy     Kiwi Extract - Food Allergy Itching     Itchy mouth       Quinine Derivatives          Social History   Social History     Substance and Sexual Activity   Alcohol Use Not Currently    Comment: Every weekend     Social History     Substance and Sexual Activity   Drug Use Never     Social History     Tobacco Use   Smoking Status Every Day    Current packs/day: 0.25    Average packs/day: 0.3 packs/day for 25.0 years (6.3 ttl pk-yrs)    Types: Cigarettes   Smokeless Tobacco Never         Family History:   Family History   Problem Relation Age of Onset    No Known Problems Mother     No Known Problems Father          Objective     Current Vitals:      No intake or output data in the 24 hours ending 08/15/24 0838    Physical Exam:  General:  Resting comfortably in bed   Eyes:Sclera anicteric  ENT: Trachea midline  Pulm:  Symmetric chest raise.  No respiratory Distress  CV:  Regular on monitor  Abdomen:  Soft NT ND  Extremities:  No clubbing/ cyanosis/ edema    Lab Results: I have personally reviewed pertinent lab results.    Imaging: I have personally reviewed pertinent reports.        ASSESSMENT:  Pj Velasquez is a 49 y.o. male who presents for outpatient colonoscopy.      PLAN:  For colonoscopy    Risks/ Benefits reviewed to include but not limited to anesthesia, bleeding, missed lesions, and colonoscopic perforation requiring surgery.

## 2024-08-15 NOTE — ANESTHESIA PREPROCEDURE EVALUATION
Procedure:  COLONOSCOPY    Relevant Problems   ANESTHESIA (within normal limits)      CARDIO   (+) Migraine      NEURO/PSYCH   (+) Anxiety   (+) Depression   (+) Migraine   (+) Panic attacks        Physical Exam    Airway    Mallampati score: II  TM Distance: >3 FB  Neck ROM: full     Dental        Cardiovascular      Pulmonary      Other Findings      Anesthesia Plan  ASA Score- 2     Anesthesia Type- IV sedation with anesthesia with ASA Monitors.         Additional Monitors:     Airway Plan:            Plan Factors-Exercise tolerance (METS): >4 METS.    Chart reviewed. EKG reviewed.  Existing labs reviewed. Patient summary reviewed.    Patient is a current smoker.              Induction- intravenous.    Postoperative Plan-         Informed Consent- Anesthetic plan and risks discussed with patient.  I personally reviewed this patient with the CRNA. Discussed and agreed on the Anesthesia Plan with the CRNA..

## 2024-08-19 PROCEDURE — 88305 TISSUE EXAM BY PATHOLOGIST: CPT | Performed by: STUDENT IN AN ORGANIZED HEALTH CARE EDUCATION/TRAINING PROGRAM

## 2024-08-29 ENCOUNTER — TELEPHONE (OUTPATIENT)
Dept: FAMILY MEDICINE CLINIC | Facility: CLINIC | Age: 49
End: 2024-08-29

## 2024-09-11 NOTE — TELEPHONE ENCOUNTER
Patient called very upset his FMLA paper work has not been completed.  He wants to talk to someone over the Dr's we received the fax 08/29/2024

## 2024-09-12 ENCOUNTER — TELEPHONE (OUTPATIENT)
Dept: FAMILY MEDICINE CLINIC | Facility: CLINIC | Age: 49
End: 2024-09-12

## 2024-09-12 NOTE — TELEPHONE ENCOUNTER
Patient returned your call re: [employer] paperwork.    Patient states FMLA was a continuation from August 1st, 2 days a week, totaling 16 hours.    Employer is giving a 5 day extension to return paperwork

## 2024-09-19 NOTE — TELEPHONE ENCOUNTER
Patient called and states the paperwork needs to read:    FMLA is a continuation from August 1st. Work is allowed 2 days a week to total 16 hrs. per week. Please resubmit with this wording.    Requesting if this addendum can be submitted asap as employer has given extension already.

## 2024-09-20 NOTE — TELEPHONE ENCOUNTER
He only saw patient in July for physical. Dr Gao previously treated him and managed his FMLA. He can be seen by another provider, try to schedule when Dr Trevino is precepting as she made the change on the paperwork that patient wants reveresed.

## 2025-01-08 ENCOUNTER — TELEPHONE (OUTPATIENT)
Dept: FAMILY MEDICINE CLINIC | Facility: CLINIC | Age: 50
End: 2025-01-08